# Patient Record
Sex: MALE | Race: WHITE | NOT HISPANIC OR LATINO
[De-identification: names, ages, dates, MRNs, and addresses within clinical notes are randomized per-mention and may not be internally consistent; named-entity substitution may affect disease eponyms.]

---

## 2017-04-06 ENCOUNTER — TRANSCRIPTION ENCOUNTER (OUTPATIENT)
Age: 27
End: 2017-04-06

## 2017-10-04 ENCOUNTER — EMERGENCY (EMERGENCY)
Facility: HOSPITAL | Age: 27
LOS: 1 days | Discharge: HOME | End: 2017-10-04
Attending: EMERGENCY MEDICINE
Payer: MEDICAID

## 2017-10-04 VITALS
OXYGEN SATURATION: 99 % | TEMPERATURE: 98 F | RESPIRATION RATE: 16 BRPM | HEART RATE: 76 BPM | SYSTOLIC BLOOD PRESSURE: 130 MMHG | DIASTOLIC BLOOD PRESSURE: 90 MMHG

## 2017-10-04 VITALS
OXYGEN SATURATION: 95 % | SYSTOLIC BLOOD PRESSURE: 130 MMHG | HEIGHT: 63 IN | DIASTOLIC BLOOD PRESSURE: 85 MMHG | HEART RATE: 97 BPM | WEIGHT: 143.96 LBS | TEMPERATURE: 98 F | RESPIRATION RATE: 16 BRPM

## 2017-10-04 LAB
ALBUMIN SERPL ELPH-MCNC: 3.6 G/DL — SIGNIFICANT CHANGE UP (ref 3.5–5)
ALP SERPL-CCNC: 89 U/L — SIGNIFICANT CHANGE UP (ref 40–120)
ALT FLD-CCNC: 43 U/L DA — SIGNIFICANT CHANGE UP (ref 10–60)
ANION GAP SERPL CALC-SCNC: 7 MMOL/L — SIGNIFICANT CHANGE UP (ref 5–17)
APPEARANCE UR: ABNORMAL
AST SERPL-CCNC: 42 U/L — HIGH (ref 10–40)
BASOPHILS # BLD AUTO: 0.1 K/UL — SIGNIFICANT CHANGE UP (ref 0–0.2)
BASOPHILS NFR BLD AUTO: 1 % — SIGNIFICANT CHANGE UP (ref 0–2)
BILIRUB SERPL-MCNC: 0.3 MG/DL — SIGNIFICANT CHANGE UP (ref 0.2–1.2)
BILIRUB UR-MCNC: ABNORMAL
BUN SERPL-MCNC: 12 MG/DL — SIGNIFICANT CHANGE UP (ref 7–18)
CALCIUM SERPL-MCNC: 9.1 MG/DL — SIGNIFICANT CHANGE UP (ref 8.4–10.5)
CHLORIDE SERPL-SCNC: 106 MMOL/L — SIGNIFICANT CHANGE UP (ref 96–108)
CO2 SERPL-SCNC: 26 MMOL/L — SIGNIFICANT CHANGE UP (ref 22–31)
COLOR SPEC: ABNORMAL
CREAT SERPL-MCNC: 0.92 MG/DL — SIGNIFICANT CHANGE UP (ref 0.5–1.3)
DIFF PNL FLD: ABNORMAL
EOSINOPHIL # BLD AUTO: 0.1 K/UL — SIGNIFICANT CHANGE UP (ref 0–0.5)
EOSINOPHIL NFR BLD AUTO: 1.1 % — SIGNIFICANT CHANGE UP (ref 0–6)
GLUCOSE SERPL-MCNC: 95 MG/DL — SIGNIFICANT CHANGE UP (ref 70–99)
GLUCOSE UR QL: NEGATIVE — SIGNIFICANT CHANGE UP
HCG UR QL: NEGATIVE — SIGNIFICANT CHANGE UP
HCT VFR BLD CALC: 42 % — SIGNIFICANT CHANGE UP (ref 34.5–45)
HGB BLD-MCNC: 13.7 G/DL — SIGNIFICANT CHANGE UP (ref 11.5–15.5)
KETONES UR-MCNC: NEGATIVE — SIGNIFICANT CHANGE UP
LEUKOCYTE ESTERASE UR-ACNC: ABNORMAL
LIDOCAIN IGE QN: 115 U/L — SIGNIFICANT CHANGE UP (ref 73–393)
LYMPHOCYTES # BLD AUTO: 2 K/UL — SIGNIFICANT CHANGE UP (ref 1–3.3)
LYMPHOCYTES # BLD AUTO: 24.7 % — SIGNIFICANT CHANGE UP (ref 13–44)
MCHC RBC-ENTMCNC: 30 PG — SIGNIFICANT CHANGE UP (ref 27–34)
MCHC RBC-ENTMCNC: 32.7 GM/DL — SIGNIFICANT CHANGE UP (ref 32–36)
MCV RBC AUTO: 91.8 FL — SIGNIFICANT CHANGE UP (ref 80–100)
MONOCYTES # BLD AUTO: 0.8 K/UL — SIGNIFICANT CHANGE UP (ref 0–0.9)
MONOCYTES NFR BLD AUTO: 10.2 % — SIGNIFICANT CHANGE UP (ref 2–14)
NEUTROPHILS # BLD AUTO: 5.2 K/UL — SIGNIFICANT CHANGE UP (ref 1.8–7.4)
NEUTROPHILS NFR BLD AUTO: 63 % — SIGNIFICANT CHANGE UP (ref 43–77)
NITRITE UR-MCNC: POSITIVE
PH UR: 8 — SIGNIFICANT CHANGE UP (ref 5–8)
PLATELET # BLD AUTO: 218 K/UL — SIGNIFICANT CHANGE UP (ref 150–400)
POTASSIUM SERPL-MCNC: 3.9 MMOL/L — SIGNIFICANT CHANGE UP (ref 3.5–5.3)
POTASSIUM SERPL-SCNC: 3.9 MMOL/L — SIGNIFICANT CHANGE UP (ref 3.5–5.3)
PROT SERPL-MCNC: 7.2 G/DL — SIGNIFICANT CHANGE UP (ref 6–8.3)
PROT UR-MCNC: 30 MG/DL
RBC # BLD: 4.58 M/UL — SIGNIFICANT CHANGE UP (ref 3.8–5.2)
RBC # FLD: 11.9 % — SIGNIFICANT CHANGE UP (ref 10.3–14.5)
SODIUM SERPL-SCNC: 139 MMOL/L — SIGNIFICANT CHANGE UP (ref 135–145)
SP GR SPEC: 1.01 — SIGNIFICANT CHANGE UP (ref 1.01–1.02)
UROBILINOGEN FLD QL: 12
WBC # BLD: 8.3 K/UL — SIGNIFICANT CHANGE UP (ref 3.8–10.5)
WBC # FLD AUTO: 8.3 K/UL — SIGNIFICANT CHANGE UP (ref 3.8–10.5)

## 2017-10-04 PROCEDURE — 81025 URINE PREGNANCY TEST: CPT

## 2017-10-04 PROCEDURE — 99284 EMERGENCY DEPT VISIT MOD MDM: CPT | Mod: 25

## 2017-10-04 PROCEDURE — 87086 URINE CULTURE/COLONY COUNT: CPT

## 2017-10-04 PROCEDURE — 76770 US EXAM ABDO BACK WALL COMP: CPT | Mod: 26

## 2017-10-04 PROCEDURE — 85027 COMPLETE CBC AUTOMATED: CPT

## 2017-10-04 PROCEDURE — 83690 ASSAY OF LIPASE: CPT

## 2017-10-04 PROCEDURE — 81001 URINALYSIS AUTO W/SCOPE: CPT

## 2017-10-04 PROCEDURE — 80053 COMPREHEN METABOLIC PANEL: CPT

## 2017-10-04 PROCEDURE — 96374 THER/PROPH/DIAG INJ IV PUSH: CPT

## 2017-10-04 PROCEDURE — 99284 EMERGENCY DEPT VISIT MOD MDM: CPT

## 2017-10-04 PROCEDURE — 76770 US EXAM ABDO BACK WALL COMP: CPT

## 2017-10-04 RX ORDER — CEFTRIAXONE 500 MG/1
1 INJECTION, POWDER, FOR SOLUTION INTRAMUSCULAR; INTRAVENOUS ONCE
Qty: 0 | Refills: 0 | Status: DISCONTINUED | OUTPATIENT
Start: 2017-10-04 | End: 2017-10-04

## 2017-10-04 RX ORDER — CEFTRIAXONE 500 MG/1
1 INJECTION, POWDER, FOR SOLUTION INTRAMUSCULAR; INTRAVENOUS ONCE
Qty: 0 | Refills: 0 | Status: COMPLETED | OUTPATIENT
Start: 2017-10-04 | End: 2017-10-04

## 2017-10-04 RX ORDER — CEFTRIAXONE 500 MG/1
1000 INJECTION, POWDER, FOR SOLUTION INTRAMUSCULAR; INTRAVENOUS ONCE
Qty: 0 | Refills: 0 | Status: COMPLETED | OUTPATIENT
Start: 2017-10-04 | End: 2017-10-04

## 2017-10-04 RX ORDER — CEFPODOXIME PROXETIL 100 MG
1 TABLET ORAL
Qty: 28 | Refills: 0 | OUTPATIENT
Start: 2017-10-04 | End: 2017-10-18

## 2017-10-04 RX ADMIN — CEFTRIAXONE 100 GRAM(S): 500 INJECTION, POWDER, FOR SOLUTION INTRAMUSCULAR; INTRAVENOUS at 23:00

## 2017-10-04 NOTE — ED PROVIDER NOTE - OBJECTIVE STATEMENT
26 y/o F pt w/ no significant PMHx presents to ED c/o L flank pain x2 days and dysuria/hematuria since yesterday. Pt also notes a fever (TMax 102F) and nausea for the past 2 days. Pt denies vomiting, vaginal bleeding, vaginal discharge, urinary frequency/urgency, or any other complaints. Pt reports no recent antibiotic use. LMP 2 weeks ago per pt. NKDA.

## 2017-10-04 NOTE — ED ADULT NURSE NOTE - OBJECTIVE STATEMENT
28 y/o F pt w/ no significant PMHx presents to ED c/o L flank pain x2 days and dysuria/hematuria since yesterday. Pt also notes a fever (TMax 102F) and nausea for the past 2 days. Pt denies vomiting, vaginal bleeding, vaginal discharge, urinary frequency/urgency, or any other complaints. Pt reports no recent antibiotic use. LMP 2 weeks ago per pt. NKDA.

## 2017-10-04 NOTE — ED PROVIDER NOTE - MEDICAL DECISION MAKING DETAILS
Suspect pyelonephritis. Will check labs, CBC, UA. Will give antibiotics. Do no suspect abdominal or pelvic pathology at this time.

## 2017-10-06 LAB
CULTURE RESULTS: SIGNIFICANT CHANGE UP
SPECIMEN SOURCE: SIGNIFICANT CHANGE UP

## 2017-10-07 DIAGNOSIS — N12 TUBULO-INTERSTITIAL NEPHRITIS, NOT SPECIFIED AS ACUTE OR CHRONIC: ICD-10-CM

## 2017-10-27 ENCOUNTER — TRANSCRIPTION ENCOUNTER (OUTPATIENT)
Age: 27
End: 2017-10-27

## 2017-11-14 ENCOUNTER — TRANSCRIPTION ENCOUNTER (OUTPATIENT)
Age: 27
End: 2017-11-14

## 2017-12-22 ENCOUNTER — EMERGENCY (EMERGENCY)
Facility: HOSPITAL | Age: 27
LOS: 1 days | Discharge: HOME | End: 2017-12-22
Attending: EMERGENCY MEDICINE
Payer: MEDICAID

## 2017-12-22 VITALS
WEIGHT: 143.96 LBS | OXYGEN SATURATION: 99 % | DIASTOLIC BLOOD PRESSURE: 88 MMHG | SYSTOLIC BLOOD PRESSURE: 132 MMHG | HEART RATE: 90 BPM | HEIGHT: 63 IN | TEMPERATURE: 98 F | RESPIRATION RATE: 18 BRPM

## 2017-12-22 PROCEDURE — 99284 EMERGENCY DEPT VISIT MOD MDM: CPT

## 2017-12-22 PROCEDURE — 70491 CT SOFT TISSUE NECK W/DYE: CPT

## 2017-12-22 PROCEDURE — 99284 EMERGENCY DEPT VISIT MOD MDM: CPT | Mod: 25

## 2017-12-22 PROCEDURE — 70491 CT SOFT TISSUE NECK W/DYE: CPT | Mod: 26

## 2017-12-22 RX ORDER — ACETAMINOPHEN 500 MG
650 TABLET ORAL ONCE
Qty: 0 | Refills: 0 | Status: COMPLETED | OUTPATIENT
Start: 2017-12-22 | End: 2017-12-22

## 2017-12-22 RX ADMIN — Medication 650 MILLIGRAM(S): at 22:55
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
## 2018-02-06 ENCOUNTER — TRANSCRIPTION ENCOUNTER (OUTPATIENT)
Age: 28
End: 2018-02-06

## 2018-02-08 ENCOUNTER — TRANSCRIPTION ENCOUNTER (OUTPATIENT)
Age: 28
End: 2018-02-08

## 2018-06-18 ENCOUNTER — TRANSCRIPTION ENCOUNTER (OUTPATIENT)
Age: 28
End: 2018-06-18

## 2018-06-24 ENCOUNTER — TRANSCRIPTION ENCOUNTER (OUTPATIENT)
Age: 28
End: 2018-06-24

## 2018-08-01 ENCOUNTER — OUTPATIENT (OUTPATIENT)
Dept: OUTPATIENT SERVICES | Facility: HOSPITAL | Age: 28
LOS: 1 days | End: 2018-08-01
Payer: MEDICAID

## 2018-08-01 PROCEDURE — G9001: CPT

## 2018-08-03 DIAGNOSIS — Z71.89 OTHER SPECIFIED COUNSELING: ICD-10-CM

## 2018-10-19 ENCOUNTER — TRANSCRIPTION ENCOUNTER (OUTPATIENT)
Age: 28
End: 2018-10-19

## 2018-11-26 ENCOUNTER — EMERGENCY (EMERGENCY)
Facility: HOSPITAL | Age: 28
LOS: 1 days | End: 2018-11-26
Payer: COMMERCIAL

## 2018-11-26 ENCOUNTER — INPATIENT (INPATIENT)
Facility: HOSPITAL | Age: 28
LOS: 1 days | Discharge: HOME | DRG: 103 | End: 2018-11-28
Attending: HOSPITALIST | Admitting: HOSPITALIST
Payer: COMMERCIAL

## 2018-11-26 DIAGNOSIS — H54.7 UNSPECIFIED VISUAL LOSS: ICD-10-CM

## 2018-11-26 PROCEDURE — 99285 EMERGENCY DEPT VISIT HI MDM: CPT | Mod: 25

## 2018-11-27 VITALS
TEMPERATURE: 98 F | DIASTOLIC BLOOD PRESSURE: 80 MMHG | HEART RATE: 87 BPM | SYSTOLIC BLOOD PRESSURE: 128 MMHG | OXYGEN SATURATION: 97 % | WEIGHT: 129.41 LBS | RESPIRATION RATE: 18 BRPM | HEIGHT: 63 IN

## 2018-11-27 DIAGNOSIS — H53.9 UNSPECIFIED VISUAL DISTURBANCE: ICD-10-CM

## 2018-11-27 DIAGNOSIS — F41.9 ANXIETY DISORDER, UNSPECIFIED: ICD-10-CM

## 2018-11-27 DIAGNOSIS — Z29.9 ENCOUNTER FOR PROPHYLACTIC MEASURES, UNSPECIFIED: ICD-10-CM

## 2018-11-27 DIAGNOSIS — J45.40 MODERATE PERSISTENT ASTHMA, UNCOMPLICATED: ICD-10-CM

## 2018-11-27 DIAGNOSIS — F90.9 ATTENTION-DEFICIT HYPERACTIVITY DISORDER, UNSPECIFIED TYPE: ICD-10-CM

## 2018-11-27 LAB
ANION GAP SERPL CALC-SCNC: 13 MMOL/L — SIGNIFICANT CHANGE UP (ref 5–17)
BUN SERPL-MCNC: 11 MG/DL — SIGNIFICANT CHANGE UP (ref 7–23)
CALCIUM SERPL-MCNC: 9.2 MG/DL — SIGNIFICANT CHANGE UP (ref 8.4–10.5)
CHLORIDE SERPL-SCNC: 103 MMOL/L — SIGNIFICANT CHANGE UP (ref 96–108)
CHOLEST SERPL-MCNC: 119 MG/DL — SIGNIFICANT CHANGE UP (ref 10–199)
CO2 SERPL-SCNC: 24 MMOL/L — SIGNIFICANT CHANGE UP (ref 22–31)
CREAT SERPL-MCNC: 0.79 MG/DL — SIGNIFICANT CHANGE UP (ref 0.5–1.3)
CRP SERPL-MCNC: <0.1 MG/DL — SIGNIFICANT CHANGE UP (ref 0–0.4)
GLUCOSE SERPL-MCNC: 84 MG/DL — SIGNIFICANT CHANGE UP (ref 70–99)
HBA1C BLD-MCNC: 5 % — SIGNIFICANT CHANGE UP (ref 4–5.6)
HCT VFR BLD CALC: 38.8 % — SIGNIFICANT CHANGE UP (ref 34.5–45)
HDLC SERPL-MCNC: 41 MG/DL — LOW
HGB BLD-MCNC: 12.9 G/DL — SIGNIFICANT CHANGE UP (ref 11.5–15.5)
LIPID PNL WITH DIRECT LDL SERPL: 68 MG/DL — SIGNIFICANT CHANGE UP
MAGNESIUM SERPL-MCNC: 2.1 MG/DL — SIGNIFICANT CHANGE UP (ref 1.6–2.6)
MCHC RBC-ENTMCNC: 29.1 PG — SIGNIFICANT CHANGE UP (ref 27–34)
MCHC RBC-ENTMCNC: 33.2 GM/DL — SIGNIFICANT CHANGE UP (ref 32–36)
MCV RBC AUTO: 87.6 FL — SIGNIFICANT CHANGE UP (ref 80–100)
PHOSPHATE SERPL-MCNC: 3.7 MG/DL — SIGNIFICANT CHANGE UP (ref 2.5–4.5)
PLATELET # BLD AUTO: 206 K/UL — SIGNIFICANT CHANGE UP (ref 150–400)
POTASSIUM SERPL-MCNC: 3.8 MMOL/L — SIGNIFICANT CHANGE UP (ref 3.5–5.3)
POTASSIUM SERPL-SCNC: 3.8 MMOL/L — SIGNIFICANT CHANGE UP (ref 3.5–5.3)
RBC # BLD: 4.43 M/UL — SIGNIFICANT CHANGE UP (ref 3.8–5.2)
RBC # FLD: 13.1 % — SIGNIFICANT CHANGE UP (ref 10.3–14.5)
SODIUM SERPL-SCNC: 140 MMOL/L — SIGNIFICANT CHANGE UP (ref 135–145)
TOTAL CHOLESTEROL/HDL RATIO MEASUREMENT: 2.9 RATIO — LOW (ref 3.3–7.1)
TRIGL SERPL-MCNC: 52 MG/DL — SIGNIFICANT CHANGE UP (ref 10–149)
WBC # BLD: 6.46 K/UL — SIGNIFICANT CHANGE UP (ref 3.8–10.5)
WBC # FLD AUTO: 6.46 K/UL — SIGNIFICANT CHANGE UP (ref 3.8–10.5)

## 2018-11-27 PROCEDURE — 85730 THROMBOPLASTIN TIME PARTIAL: CPT

## 2018-11-27 PROCEDURE — 81025 URINE PREGNANCY TEST: CPT

## 2018-11-27 PROCEDURE — 99223 1ST HOSP IP/OBS HIGH 75: CPT | Mod: AI,GC

## 2018-11-27 PROCEDURE — 70548 MR ANGIOGRAPHY NECK W/DYE: CPT | Mod: 26

## 2018-11-27 PROCEDURE — 82962 GLUCOSE BLOOD TEST: CPT

## 2018-11-27 PROCEDURE — 85610 PROTHROMBIN TIME: CPT

## 2018-11-27 PROCEDURE — 70450 CT HEAD/BRAIN W/O DYE: CPT

## 2018-11-27 PROCEDURE — 85027 COMPLETE CBC AUTOMATED: CPT

## 2018-11-27 PROCEDURE — 96365 THER/PROPH/DIAG IV INF INIT: CPT

## 2018-11-27 PROCEDURE — 99221 1ST HOSP IP/OBS SF/LOW 40: CPT

## 2018-11-27 PROCEDURE — 12345: CPT | Mod: NC

## 2018-11-27 PROCEDURE — 70553 MRI BRAIN STEM W/O & W/DYE: CPT | Mod: 26

## 2018-11-27 PROCEDURE — 93010 ELECTROCARDIOGRAM REPORT: CPT

## 2018-11-27 PROCEDURE — 99223 1ST HOSP IP/OBS HIGH 75: CPT

## 2018-11-27 PROCEDURE — 99285 EMERGENCY DEPT VISIT HI MDM: CPT

## 2018-11-27 PROCEDURE — 80053 COMPREHEN METABOLIC PANEL: CPT

## 2018-11-27 RX ORDER — LORATADINE 10 MG/1
10 TABLET ORAL DAILY
Qty: 0 | Refills: 0 | Status: DISCONTINUED | OUTPATIENT
Start: 2018-11-27 | End: 2018-11-28

## 2018-11-27 RX ORDER — MONTELUKAST 4 MG/1
1 TABLET, CHEWABLE ORAL
Qty: 0 | Refills: 0 | COMMUNITY

## 2018-11-27 RX ORDER — METHYLPHENIDATE HCL 5 MG
54 TABLET ORAL
Qty: 0 | Refills: 0 | Status: DISCONTINUED | OUTPATIENT
Start: 2018-11-27 | End: 2018-11-27

## 2018-11-27 RX ORDER — ACETAMINOPHEN 500 MG
650 TABLET ORAL ONCE
Qty: 0 | Refills: 0 | Status: COMPLETED | OUTPATIENT
Start: 2018-11-27 | End: 2018-11-27

## 2018-11-27 RX ORDER — MONTELUKAST 4 MG/1
10 TABLET, CHEWABLE ORAL DAILY
Qty: 0 | Refills: 0 | Status: DISCONTINUED | OUTPATIENT
Start: 2018-11-27 | End: 2018-11-28

## 2018-11-27 RX ORDER — METHYLPHENIDATE HCL 5 MG
15 TABLET ORAL THREE TIMES A DAY
Qty: 0 | Refills: 0 | Status: DISCONTINUED | OUTPATIENT
Start: 2018-11-27 | End: 2018-11-27

## 2018-11-27 RX ORDER — BUPROPION HYDROCHLORIDE 150 MG/1
1 TABLET, EXTENDED RELEASE ORAL
Qty: 0 | Refills: 0 | COMMUNITY

## 2018-11-27 RX ORDER — METHYLPHENIDATE HCL 5 MG
1 TABLET ORAL
Qty: 0 | Refills: 0 | COMMUNITY

## 2018-11-27 RX ORDER — ALBUTEROL 90 UG/1
2 AEROSOL, METERED ORAL EVERY 6 HOURS
Qty: 0 | Refills: 0 | Status: DISCONTINUED | OUTPATIENT
Start: 2018-11-27 | End: 2018-11-28

## 2018-11-27 RX ORDER — BUPROPION HYDROCHLORIDE 150 MG/1
150 TABLET, EXTENDED RELEASE ORAL DAILY
Qty: 0 | Refills: 0 | Status: DISCONTINUED | OUTPATIENT
Start: 2018-11-27 | End: 2018-11-28

## 2018-11-27 RX ORDER — FLUTICASONE FUROATE AND VILANTEROL TRIFENATATE 100; 25 UG/1; UG/1
1 POWDER RESPIRATORY (INHALATION)
Qty: 0 | Refills: 0 | COMMUNITY

## 2018-11-27 RX ORDER — ALBUTEROL 90 UG/1
2 AEROSOL, METERED ORAL
Qty: 0 | Refills: 0 | COMMUNITY

## 2018-11-27 RX ORDER — METHYLPHENIDATE HCL 5 MG
54 TABLET ORAL
Qty: 0 | Refills: 0 | Status: DISCONTINUED | OUTPATIENT
Start: 2018-11-27 | End: 2018-11-28

## 2018-11-27 RX ADMIN — Medication 650 MILLIGRAM(S): at 02:02

## 2018-11-27 RX ADMIN — MONTELUKAST 10 MILLIGRAM(S): 4 TABLET, CHEWABLE ORAL at 22:55

## 2018-11-27 RX ADMIN — BUPROPION HYDROCHLORIDE 150 MILLIGRAM(S): 150 TABLET, EXTENDED RELEASE ORAL at 02:01

## 2018-11-27 RX ADMIN — BUPROPION HYDROCHLORIDE 150 MILLIGRAM(S): 150 TABLET, EXTENDED RELEASE ORAL at 22:56

## 2018-11-27 RX ADMIN — LORATADINE 10 MILLIGRAM(S): 10 TABLET ORAL at 11:07

## 2018-11-27 RX ADMIN — MONTELUKAST 10 MILLIGRAM(S): 4 TABLET, CHEWABLE ORAL at 02:01

## 2018-11-27 RX ADMIN — Medication 54 MILLIGRAM(S): at 07:21

## 2018-11-27 RX ADMIN — Medication 650 MILLIGRAM(S): at 03:00

## 2018-11-27 RX ADMIN — LORATADINE 10 MILLIGRAM(S): 10 TABLET ORAL at 22:57

## 2018-11-27 NOTE — PROGRESS NOTE ADULT - PROBLEM SELECTOR PLAN 1
Patient presenting with R eye acute vision change with "kaleidoscope vision"  with brief complete vision loss associated with reported dysarthria and RUE weakness. CT head neg for acute hemorrhage. Fundoscopic exam wnl.  optho eval appreciated DDx: ocular migraine, demyelinating disease, TIA, vasculitis  NEurology consult appreciated- MRI brain and orbits with MRA head and neck, ?TTE  neurochecks q4  - ESR 10   EKG sinus rhythm

## 2018-11-27 NOTE — CONSULT NOTE ADULT - SUBJECTIVE AND OBJECTIVE BOX
[facility] Ophthalmology Consult Note    HPI: 28 y/ F, PMH ADHD, depression presented to [place] ED with visual complaints and right arm/hand numbness x 1 day. Patient states at 12:30PM yesterday, she experienced a visual disturbance of her eye described as "kaleidoscope appearance" and then two minute episode of complete loss of vision OD. The episode resolved however the patient endorsed difficulty using her right hand and numbness/parathesias of the limb. CT performed at [place] negative for acute pathology. Neurology team at [place] recommended ophtho evaluation, necessitating transfer to [facility].  Patient interviewed early in the morning. She states her visual disturbance comes and goes, but she is currently asymptomatic. Denies eye pain, floaters, diplopia, recent trauma, drug use, redness, irritation.   She has had chronic flashes of her right eye for 4 years, but none recently.        PMH: ADHD, depression  Meds: Wellbutrin, Methylphenidate  POcHx (including surgeries/lasers/trauma):  Myopic astigmatism. Glasses wearer.  Drops: ART PRN  FamHx: Adopted.  Social Hx:  in [place].  Allergies: NKDA    ROS:  General (neg), Vision (per HPI), Head and Neck (neg), Pulm (neg), CV (neg), GI (neg),  (neg), Musculoskeletal (neg), Skin/Integ (neg), Neuro (neg), Endocrine (neg), Heme (neg), All/Immuno (neg)    Mood and Affect Appropriate ( x ),  Oriented to Time, Place, and Person x 3 ( x )    Ophthalmology Exam    Visual acuity (sc): 20/20 OU  Pupils: PERRL OU, no APD  Ttono: 14 OU  Extraocular movements (EOMs): Full OU, no pain, no diplopia  Confrontational Visual Field (CVF):  Full OU  Color Plates: 12/12 OU    Slit Lamp Exam (SLE)  External:  Flat OU  Lids/Lashes/Lacrimal Ducts: Flat OU    Sclera/Conjunctiva:  W+Q OU  Cornea: Cl OU  Anterior Chamber: D+Q OU   Iris:  Flat OU  Lens:  Cl OU    Fundus Exam: dilated with 1% tropicamide and 2.5% phenylephrine  Approval obtained from primary team for dilation  Patient aware that pupils can remained dilated for at least 4-6 hours  Exam performed with 20D lens    Vitreous: wnl OU  Disc, cup/disc: sharp and pink, 0.4 OU  Macula:  wnl OU  Vessels:  wnl OU  Periphery: wnl OU    Diagnostic Testing:    Assessment:      Plan:  - no acute ophtho intervention  - workup per neurology/primary team    Follow-Up:  Patient should follow up his/her ophthalmologist or in the [facility] within 1 week of discharge  [address] [place] [address].  [place], [place] [id number] [phone] [facility] Ophthalmology Consult Note    HPI: 28 y/ F, PMH ADHD, depression, migraines presented to [place] ED with visual complaints and right arm/hand numbness x 1 day. Patient states at 12:30PM yesterday, she experienced a visual disturbance of her eye described as "kaleidoscope appearance" and then two minute episode of complete loss of vision OD. The episode resolved however the patient endorsed difficulty using her right hand and numbness/parathesias of the limb. CT performed at [place] negative for acute pathology. Neurology team at [place] recommended ophtho evaluation, necessitating transfer to [facility].  Patient interviewed early in the morning. She states her visual disturbance comes and goes, but she is currently asymptomatic. Denies eye pain, floaters, diplopia, recent trauma, drug use, redness, irritation. She says her limbs have been feeling more "tired" lately.   She has had chronic flashes of her right eye for 4 years, but none recently.        PMH: ADHD, depression, migraines  Meds: Wellbutrin, Methylphenidate  POcHx (including surgeries/lasers/trauma):  Myopic astigmatism. Glasses wearer.  Drops: ART PRN  FamHx: Adopted.  Social Hx:  in [place].  Allergies: NKDA    ROS:  General (neg), Vision (per HPI), Head and Neck (neg), Pulm (neg), CV (neg), GI (neg),  (neg), Musculoskeletal (neg), Skin/Integ (neg), Neuro (neg), Endocrine (neg), Heme (neg), All/Immuno (neg)    Mood and Affect Appropriate ( x ),  Oriented to Time, Place, and Person x 3 ( x )    Ophthalmology Exam    Visual acuity (sc): 20/20 OU  Pupils: PERRL OU, no APD  Ttono: 14 OU  Extraocular movements (EOMs): Full OU, no pain, no diplopia  Confrontational Visual Field (CVF):  Full OU  Color Plates: 12/12 OU    Slit Lamp Exam (SLE)  External:  Flat OU  Lids/Lashes/Lacrimal Ducts: Flat OU    Sclera/Conjunctiva:  W+Q OU  Cornea: Cl OU  Anterior Chamber: D+Q OU   Iris:  Flat OU  Lens:  Cl OU    Fundus Exam: dilated with 1% tropicamide and 2.5% phenylephrine  Approval obtained from primary team for dilation  Patient aware that pupils can remained dilated for at least 4-6 hours  Exam performed with 20D lens    Vitreous: wnl OU  Disc, cup/disc: sharp and pink, 0.3 OU  Macula:  wnl OU  Vessels:  wnl OU  Periphery: wnl OU    Diagnostic Testing: CT head performed at [place] negative for acute pathology    Assessment:  28 y/ F, PMH ADHD, depression presented to [place] ED with visual complaints and right arm/hand numbness x 1 day. Patient states at 12:30PM yesterday, she experienced a visual disturbance of her eye described as "kaleidoscope appearance" and then two minute episode of complete loss of vision OD. The episode resolved however the patient endorsed difficulty using her right hand and numbness/parathesias of the limb. CT performed at [place] negative for acute pathology. Neurology team at [place] recommended ophtho evaluation, necessitating transfer to [facility]. On exam, VA 20/20 OU, no APD, IOP WNL. Anterior segment and posterior segment exams unremarkable. Differential diagnosis includes TIA vs. migraine vs. demyelinating process      Plan:  - no acute ophtho intervention  - workup per neurology/primary team    Follow-Up:  Patient should follow up his/her ophthalmologist or in the [facility] Practice within 1 week of discharge  [address] [place] [address] [phone]

## 2018-11-27 NOTE — PROGRESS NOTE ADULT - ASSESSMENT
28 F with PMH of ADHD, depression, asthma chronic headaches who was transferred to [place] ED with visual complaints and right arm/hand numbness x 1 day, found to have an unremarkable CT head imaging and opthalmologic evaluation, raising concerning for underlying demyelinating disease vs migraine etiologies vs TIA vs vasculitis

## 2018-11-27 NOTE — H&P ADULT - NSHPSOCIALHISTORY_GEN_ALL_CORE
Never smoker  No alcohol use  No illicit drugs  Works as schoolteacher  Enganged, monogamous with fiance Never smoker  No alcohol use  No illicit drugs  Works as schoolteacher  Engaged, monogamous with ficurtis

## 2018-11-27 NOTE — H&P ADULT - HISTORY OF PRESENT ILLNESS
28 F with PMH of ADHD, depression, asthma chronic headaches who was transferred to [place] ED with visual complaints and right arm/hand numbness x 1 day. Patient was in her usual state of health while working at her job as  when around 12:20 PM on 11/26, she suddenly started experiencing kaleidoscope vision" in her R eye. She had no associated eye pain, trauma, preceding headache. The kaleidoscope vision then trantitioned to complete vision loss in the eye for 2 mins bu then resolved. She denies sensation of floaters or  curtain sign. During this episode, endorsing having difficulty holding things in her R hand as well as mild vertigo Also patient's student also reported she was saying things that did no make sense. Patient has had chronic debilitating headaches with photophobia and nausea but has not been on migraine regimen. She otherewise denies any preceding fevers, chest pain, palpitations, tracel or sick contacts. Due to persistence of sx, patient sought care at [place] ED. Initial VS T 98.6, , BP: 136/89 and 97% on RA. Labs  unremarkable. There she was briefly seen by neurology who recommended transfer to [facility] for evaluation by ophtho. She had undergone CT brain stroke protocol at  ED which was unremarkable. Here patient was evaluated by dilated exam by optho which was also within normal limits. Currently patient reports that there is intermittent episodes of the kaleidoscope vision" but overall without further episodes of eye pain or complete vision loss. SHe denied any form of illicit drug use. 28 F with PMH of ADHD, depression, asthma chronic headaches who was transferred to [place] ED with visual complaints and right arm/hand numbness x 1 day. Patient was in her usual state of health while working at her job as  when around 12:20 PM on 11/26, she suddenly started experiencing kaleidoscope vision" in her R eye. She had no associated eye pain, trauma, preceding headache. The kaleidoscope vision then trantitioned to complete vision loss in the eye for 2 mins bu then resolved. She denies sensation of floaters or  curtain sign. During this episode, endorsing having difficulty holding things in her R hand as well as mild vertigo Also patient's student also reported she was saying things that did no make sense. Patient has had chronic debilitating headaches with photophobia and nausea but has not been on migraine regimen. She otherewise denies any preceding fevers, chest pain, palpitations, tracel or sick contacts. Due to persistence of sx, patient sought care at [place] ED. Initial VS T 98.6, , BP: 136/89 and 97% on RA. Labs  unremarkable. There she was briefly seen by neurology who recommended transfer to [facility] for evaluation by ophtho. She had undergone CT brain stroke protocol at  ED which was unremarkable. Here patient was evaluated by dilated exam by optho which was also within normal limits. Currently patient reports that there is intermittent episodes of the kaleidoscope vision" but overall without further episodes of eye pain or complete vision loss. SHe denied any form of illicit drug use. She has no known FH as she is adopted 28 F with PMH of ADHD, depression, asthma chronic headaches who was transferred to [place] ED with visual complaints and right arm/hand numbness x 1 day. Patient was in her usual state of health while working at her job as  when around 12:20 PM on 11/26, she suddenly started experiencing kaleidoscope vision" in her R eye. She had no associated eye pain, trauma, preceding headache. The kaleidoscope vision then trantitioned to complete vision loss in the eye for 2 mins bu then resolved. She denies sensation of floaters or  curtain sign. During this episode, endorsing having difficulty holding things in her R hand as well as mild vertigo Also patient's student also reported she was saying things that did no make sense. Patient has had chronic debilitating headaches with photophobia and nausea but has not been on migraine regimen. She otherewise denies any preceding fevers, chest pain, palpitations, tracel or sick contacts. Due to persistence of sx, patient sought care at [place] ED. Initial VS T 98.6, , BP: 136/89 and 97% on RA. Labs  unremarkable. There she was briefly seen by neurology who recommended transfer to [facility] for evaluation by ophtho. She had undergone CT brain stroke protocol at  ED which was unremarkable. Here patient was evaluated by dilated exam by optho which was also within normal limits. Currently patient reports that there is intermittent episodes of the kaleidoscope vision" but overall without further episodes of eye pain or complete vision loss. SHe denied any form of illicit drug use. She has no known FHx of CVA or autoimmune diseases, except hypothyroidism as she is adopted

## 2018-11-27 NOTE — H&P ADULT - NSHPPHYSICALEXAM_GEN_ALL_CORE
PHYSICAL EXAM:    Vital Signs Last 24 Hrs  T(C): 36.8 (27 Nov 2018 00:07), Max: 37 (26 Nov 2018 15:17)  T(F): 98.2 (27 Nov 2018 00:07), Max: 98.6 (26 Nov 2018 15:17)  HR: 87 (27 Nov 2018 00:07) (77 - 109)  BP: 128/80 (27 Nov 2018 00:07) (127/77 - 136/89)  BP(mean): --  RR: 18 (27 Nov 2018 00:07) (16 - 18)  SpO2: 97% (27 Nov 2018 00:07) (97% - 100%)    General: Young female resting in bed no acute distress.  HEENT: NCAT. Bilateral fixed and dilated pupils (optho exam). No scleral icterus or injection.  Moist MM.  No oropharyngeal exudates.    Neck: Supple.  Full ROM.  No JVD.  No thyromegaly. No lymphadenopathy.   Heart: RRR.  Normal S1 and S2.  No murmurs, rubs, or gallops.   Lungs: CTAB. No wheezes, crackles, or rhonchi.    Abdomen: BS+, soft, NT/ND.  No organomegaly.  Skin: Warm and dry.  No rashes.  Extremities: No edema, clubbing, or cyanosis.  2+ peripheral pulses b/l.  Musculoskeletal: No deformities.  No spinal or paraspinal tenderness.  Neuro: A&Ox3.  CN II-XII intact.  5/5 strength in UE and LE b/l.  Tactile sensation intact in UE and LE b/l.  Cerebellar function intact

## 2018-11-27 NOTE — H&P ADULT - NSHPLABSRESULTS_GEN_ALL_CORE
CBC Full  -  ( 26 Nov 2018 16:08 )  WBC Count : 8.7 K/uL  Hemoglobin : 14.6 g/dL  Hematocrit : 43.2 %  Platelet Count - Automated : 226 K/uL  Mean Cell Volume : 87.9 fl  Mean Cell Hemoglobin : 29.6 pg  Mean Cell Hemoglobin Concentration : 33.7 gm/dL  Auto Neutrophil # : 5.5 K/uL  Auto Lymphocyte # : 2.6 K/uL  Auto Monocyte # : 0.5 K/uL  Auto Eosinophil # : 0.0 K/uL  Auto Basophil # : 0.1 K/uL  Auto Neutrophil % : 63.3 %  Auto Lymphocyte % : 30.1 %  Auto Monocyte % : 5.6 %  Auto Eosinophil % : 0.2 %  Auto Basophil % : 0.8 %    11-26    141  |  105  |  10  ----------------------------<  92  3.8   |  27  |  0.89    Ca    9.0      26 Nov 2018 16:08    TPro  8.1  /  Alb  4.4  /  TBili  0.3  /  DBili  x   /  AST  18  /  ALT  27  /  AlkPhos  76  11-26    LIVER FUNCTIONS - ( 26 Nov 2018 16:08 )  Alb: 4.4 g/dL / Pro: 8.1 g/dL / ALK PHOS: 76 U/L / ALT: 27 U/L DA / AST: 18 U/L / GGT: x             PT/INR - ( 26 Nov 2018 16:08 )   PT: 12.8 sec;   INR: 1.15 ratio         PTT - ( 26 Nov 2018 16:08 )  PTT:34.2 sec          EKG:       Imaging:  < from: CT Brain Stroke Protocol (11.26.18 @ 16:02) >    FINDINGS:      HEMISPHERES:  No mass or space occupying lesion.  No acute ischemic   changes or hemorrhagic foci are suggested.  VENTRICLES:  Midline and normal in size.  POSTERIOR FOSSA:  The brain stem and cerebellum are unremarkable.  No CP   angle lesion noted.  EXTRACEREBRAL SPACES:  No subdural or epidural collections are noted.  SKULL BASE AND CALVARIUM:  Appears intact.  No fracture or destructive   lesion is identified.  SINUSES AND MASTOIDS:  Clear.  MISCELLANEOUS:  No orbital or suprasellar abnormality noted.      < end of copied text > Labs, imaging and EKG tracing personally reviewed    CBC Full  -  ( 26 Nov 2018 16:08 )  WBC Count : 8.7 K/uL  Hemoglobin : 14.6 g/dL  Hematocrit : 43.2 %  Platelet Count - Automated : 226 K/uL  Mean Cell Volume : 87.9 fl  Mean Cell Hemoglobin : 29.6 pg  Mean Cell Hemoglobin Concentration : 33.7 gm/dL  Auto Neutrophil # : 5.5 K/uL  Auto Lymphocyte # : 2.6 K/uL  Auto Monocyte # : 0.5 K/uL  Auto Eosinophil # : 0.0 K/uL  Auto Basophil # : 0.1 K/uL  Auto Neutrophil % : 63.3 %  Auto Lymphocyte % : 30.1 %  Auto Monocyte % : 5.6 %  Auto Eosinophil % : 0.2 %  Auto Basophil % : 0.8 %    11-26    141  |  105  |  10  ----------------------------<  92  3.8   |  27  |  0.89    Ca    9.0      26 Nov 2018 16:08    TPro  8.1  /  Alb  4.4  /  TBili  0.3  /  DBili  x   /  AST  18  /  ALT  27  /  AlkPhos  76  11-26    LIVER FUNCTIONS - ( 26 Nov 2018 16:08 )  Alb: 4.4 g/dL / Pro: 8.1 g/dL / ALK PHOS: 76 U/L / ALT: 27 U/L DA / AST: 18 U/L / GGT: x             PT/INR - ( 26 Nov 2018 16:08 )   PT: 12.8 sec;   INR: 1.15 ratio         PTT - ( 26 Nov 2018 16:08 )  PTT:34.2 sec          EKG:       Imaging:  < from: CT Brain Stroke Protocol (11.26.18 @ 16:02) >    FINDINGS:      HEMISPHERES:  No mass or space occupying lesion.  No acute ischemic   changes or hemorrhagic foci are suggested.  VENTRICLES:  Midline and normal in size.  POSTERIOR FOSSA:  The brain stem and cerebellum are unremarkable.  No CP   angle lesion noted.  EXTRACEREBRAL SPACES:  No subdural or epidural collections are noted.  SKULL BASE AND CALVARIUM:  Appears intact.  No fracture or destructive   lesion is identified.  SINUSES AND MASTOIDS:  Clear.  MISCELLANEOUS:  No orbital or suprasellar abnormality noted.      < end of copied text >

## 2018-11-27 NOTE — PATIENT PROFILE ADULT - VISION (WITH CORRECTIVE LENSES IF THE PATIENT USUALLY WEARS THEM):
impaired only in right eye/Partially impaired: cannot see medication labels or newsprint, but can see obstacles in path, and the surrounding layout; can count fingers at arm's length

## 2018-11-27 NOTE — PROGRESS NOTE ADULT - SUBJECTIVE AND OBJECTIVE BOX
Patient is a 28y old  Female who presents with a chief complaint of R eye vision changes (27 Nov 2018 10:53)        SUBJECTIVE / OVERNIGHT EVENTS: no acute complaints      MEDICATIONS  (STANDING):  buPROPion XL . 150 milliGRAM(s) Oral daily  loratadine 10 milliGRAM(s) Oral daily  methylphenidate ER (CONCERTA) 54 milliGRAM(s) Oral <User Schedule>  montelukast 10 milliGRAM(s) Oral daily    MEDICATIONS  (PRN):  ALBUTerol    90 MICROgram(s) HFA Inhaler 2 Puff(s) Inhalation every 6 hours PRN Shortness of Breath and/or Wheezing      Vital Signs Last 24 Hrs  T(C): 36.8 (27 Nov 2018 05:17), Max: 37 (26 Nov 2018 15:17)  T(F): 98.2 (27 Nov 2018 05:17), Max: 98.6 (26 Nov 2018 15:17)  HR: 73 (27 Nov 2018 05:17) (73 - 109)  BP: 104/66 (27 Nov 2018 05:17) (104/66 - 136/89)  BP(mean): --  RR: 18 (27 Nov 2018 05:17) (16 - 18)  SpO2: 99% (27 Nov 2018 05:17) (97% - 100%)  CAPILLARY BLOOD GLUCOSE      POCT Blood Glucose.: 111 mg/dL (26 Nov 2018 15:40)    I&O's Summary    27 Nov 2018 07:01  -  27 Nov 2018 14:05  --------------------------------------------------------  IN: 120 mL / OUT: 0 mL / NET: 120 mL        PHYSICAL EXAM:  GENERAL: NAD  HEAD:  Atraumatic, Normocephalic  EYES: conjunctiva and sclera clear  NECK: No JVD  CHEST/LUNG: CTA b/l  HEART: S1 S2 RRR  ABDOMEN: +BS Soft, NT/ND  EXTREMITIES:  2+ DP Pulses, No c/c/e  NEUROLOGY: AAOx3, no focal deficits   SKIN: No rashes or lesions    LABS:                        12.9   6.46  )-----------( 206      ( 27 Nov 2018 08:01 )             38.8     11-27    140  |  103  |  11  ----------------------------<  84  3.8   |  24  |  0.79    Ca    9.2      27 Nov 2018 07:04  Phos  3.7     11-27  Mg     2.1     11-27    TPro  8.1  /  Alb  4.4  /  TBili  0.3  /  DBili  x   /  AST  18  /  ALT  27  /  AlkPhos  76  11-26    PT/INR - ( 26 Nov 2018 16:08 )   PT: 12.8 sec;   INR: 1.15 ratio         PTT - ( 26 Nov 2018 16:08 )  PTT:34.2 sec          RADIOLOGY & ADDITIONAL TESTS:    Imaging Personally Reviewed: MRI brain and orbits ordered  Consultant(s) Notes Reviewed: Neurology   Care Discussed with Consultants/Other Providers:

## 2018-11-27 NOTE — H&P ADULT - NSHPREVIEWOFSYSTEMS_GEN_ALL_CORE
REVIEW OF SYSTEMS:    CONSTITUTIONAL: No weakness, fevers or chills  EYES/ENT:+ visual changes;  No vertigo or throat pain   NECK: No pain or stiffness  RESPIRATORY: No cough, wheezing, hemoptysis; No shortness of breath  CARDIOVASCULAR: No chest pain or palpitations  GASTROINTESTINAL: No abdominal pain; nausea, vomiting;  diarrhea or constipation. No hemetemesis, melena or hematochezia.  GENITOURINARY: No dysuria, frequency or hematuria  NEUROLOGICAL: + numbness tingle of R arm  SKIN: No itching, burning, rashes, or lesions   All other review of systems is negative unless indicated above.

## 2018-11-27 NOTE — H&P ADULT - PROBLEM SELECTOR PLAN 1
Patient presenting with R eye acute vision change with "kaleidoscope vision"  with brief complete vision loss associated with reported dysarthria and RUE weakness. Although she is still intermittent R vision sx, remaining associated are resolved. CT head neg for acute hemorrhage. Fundoscopic exam wnl.  DDx: ocular migraine, demyelinating disease, TIA, vasculitis  - neuro check q12  - would obtain MRI with MRA head and neck  - ESR, CRP   - EKG x 1   - neurology consult in AM Patient presenting with R eye acute vision change with "kaleidoscope vision"  with brief complete vision loss associated with reported dysarthria and RUE weakness. Although she is still intermittent R vision sx, remaining associated are resolved. CT head neg for acute hemorrhage. Fundoscopic exam wnl.  DDx: ocular migraine, demyelinating disease, TIA, vasculitis  - neuro check q4  - MRI with MRA head and neck  - ESR, CRP   - EKG now  - TTE  - neurology consult in AM

## 2018-11-27 NOTE — H&P ADULT - PROBLEM SELECTOR PLAN 3
No recent increased use of rescue inhaler or night sx. Clinically with benign lung exam and saturating well on RA  - c/w Breo, albuterol,  montelukast, and loratadine

## 2018-11-27 NOTE — H&P ADULT - ASSESSMENT
28 F with PMH of ADHD, depression, asthma chronic headaches who was transferred to [place] ED with visual complaints and right arm/hand numbness x 1 day, found to have an unremarkable CT head imaging and opthalmologic evaluation, raising concerning for underlying demyelinating disease vs migraine etiologies vs TIA vs vasculitis

## 2018-11-27 NOTE — CONSULT NOTE ADULT - SUBJECTIVE AND OBJECTIVE BOX
Neurology Consult    Reason for consult:    HPI:  28 F with PMH of ADHD ( on methylphenidate), depression ( on Bupropion), Anxeity ( PRN Lorazepam), asthma ( On Montelukast)  chronic tension headaches who was transferred to [place] ED with visual complaints (kaleidoscope/ zigzag vision in her R eye) and right arm/hand weakness and  numbness lasting for 2.5 hours  x 1 day.     Patient was in her usual state of health while working at her job as  when around 12:20 PM on 11/26, she suddenly started experiencing kaleidoscope/ zigzag vision in her R eye. She had no associated eye pain, trauma, preceding headache. The kaleidoscope vision then transitioned to complete vision loss in right eye for 2 mins but then resolved. She denies sensation of floaters or  curtain sign. During this episode, endorsing having difficulty holding things in her R hand as well as mild dizziness.  Also patient's student also reported she was saying things that did no make sense. This all weakness lasted for around 2 hours and resolved at around at about 2.30pm-3pm  Since yesterday she had 6-7 episodes of kaleidoscope/ zigzag vision in her R eye. lasting for around 30mins associated with headaches. She describes her headaches as sharp 8-9/10 behind right eye.   She also complains of pain in her right eye when she looks up and on left.   She also complains of neck pain behind her ear radiating to back     Patient has had chronic debilitating headaches with photophobia and nausea but has not been on migraine regimen, never needed ED visits. She otherewise denies any preceding fevers, chest pain, palpitations, tracel or sick contacts. Due to persistence of sx, patient sought care at [place] ED. Initial VS T 98.6, , BP: 136/89 and 97% on RA. Labs  unremarkable. There she was briefly seen by neurology who recommended transfer to [facility] for evaluation by ophtho. She had undergone CT brain stroke protocol at  ED which was unremarkable. Here patient was evaluated by dilated exam by optho which was also within normal limits. Currently patient reports that there is intermittent episodes of the kaleidoscope vision" but overall without further episodes of eye pain or complete vision loss. SHe denied any form of illicit drug use. She has no known FHx of CVA or autoimmune diseases, except hypothyroidism as she is adopted         REVIEW OF SYSTEMS:  Constitutional: No fever, chills, fatigue, weakness.  Eyes: No eye pain, visual disturbances, or discharge.   ENT:  No difficulty hearing, tinnitus, vertigo. No sinus or throat pain.  Neck: No pain or stiffness.  Respiratory: No cough, dyspnea, wheezing.  Cardiovascular: No chest pain, palpitations.  Gastrointestinal: No abdominal pain. No nausea, vomiting, diarrhea, or constipation.   Genitourinary: No dysuria, frequency, hematuria or incontinence.  Neurological: + headaches, + right occipital tenderness No lightheadedness, vertigo, numbness or tremors.  Psychiatric: No depression, anxiety, mood swings, or difficulty sleeping.  Musculoskeletal: No joint pain or swelling. No muscle, back, or extremity pain.  Skin: No itching, burning, rashes or lesions.   Lymph Nodes: No enlarged glands  Endocrine: No heat or cold intolerance, no hair loss.  Allergy and Immunologic: No hives or eczema.    MEDICATIONS  ALBUTerol    90 MICROgram(s) HFA Inhaler 2 Puff(s) Inhalation every 6 hours PRN  buPROPion XL . 150 milliGRAM(s) Oral daily  loratadine 10 milliGRAM(s) Oral daily  methylphenidate ER (CONCERTA) 54 milliGRAM(s) Oral <User Schedule>  montelukast 10 milliGRAM(s) Oral daily      PMH: Asthma  Depression  ADHD  No pertinent past medical history       PSH: No significant past surgical history  No significant past surgical history      FAMILY HISTORY:  No pertinent family history in first degree relatives    No history of dementia, strokes, or seizures     SOCIAL HISTORY:  No history of tobacco or alcohol use     Allergies    No Known Allergies    Intolerances        Vital Signs Last 24 Hrs  T(C): 36.8 (27 Nov 2018 05:17), Max: 37 (26 Nov 2018 15:17)  T(F): 98.2 (27 Nov 2018 05:17), Max: 98.6 (26 Nov 2018 15:17)  HR: 73 (27 Nov 2018 05:17) (73 - 109)  BP: 104/66 (27 Nov 2018 05:17) (104/66 - 136/89)  BP(mean): --  RR: 18 (27 Nov 2018 05:17) (16 - 18)  SpO2: 99% (27 Nov 2018 05:17) (97% - 100%)    Neurological Examination:    Mental Status: Patient is alert, awake, oriented X3. Patient is fluent, no dysarthria, no aphasia. Follows commands well and able to answer questions appropriately. Mood and affect normal.    Cranial Nerves: PERRL, EOMI, visual field intact, V1-V3 intact, no gross facial asymmetry, tongue/uvula midline. mild painful eye movements on right eye when she looks on left upper side      Motor Exam: No drift  Right upper extremity: 5/5  Left upper extremity: 5/5  Right lower extremity: 5/5  Left lower extremity: 5/5    Normal bulk/tone    Sensory: Intact to light touch and pinprick bilaterally. No extinction    Coordination: Finger to nose intact bilaterally     Gait: Normal      Reflexes: Bilateral 2+ Biceps, Brachial, Patellar, Ankle  Plantar: Down bilateral    GENERAL: No acute distress  HEENT:  Normocephalic, atraumatic  EXTREMITIES: No edema, clubbing, cyanosis  MUSCULOSKELETAL: Normal range of motion  SKIN: No rashes    LABS:  CBC Full  -  ( 27 Nov 2018 08:01 )  WBC Count : 6.46 K/uL  Hemoglobin : 12.9 g/dL  Hematocrit : 38.8 %  Platelet Count - Automated : 206 K/uL  Mean Cell Volume : 87.6 fl  Mean Cell Hemoglobin : 29.1 pg  Mean Cell Hemoglobin Concentration : 33.2 gm/dL  Auto Neutrophil # : x  Auto Lymphocyte # : x  Auto Monocyte # : x  Auto Eosinophil # : x  Auto Basophil # : x  Auto Neutrophil % : x  Auto Lymphocyte % : x  Auto Monocyte % : x  Auto Eosinophil % : x  Auto Basophil % : x      11-27    140  |  103  |  11  ----------------------------<  84  3.8   |  24  |  0.79    Ca    9.2      27 Nov 2018 07:04  Phos  3.7     11-27  Mg     2.1     11-27    TPro  8.1  /  Alb  4.4  /  TBili  0.3  /  DBili  x   /  AST  18  /  ALT  27  /  AlkPhos  76  11-26    LIVER FUNCTIONS - ( 26 Nov 2018 16:08 )  Alb: 4.4 g/dL / Pro: 8.1 g/dL / ALK PHOS: 76 U/L / ALT: 27 U/L DA / AST: 18 U/L / GGT: x           Hemoglobin A1C:       PT/INR - ( 26 Nov 2018 16:08 )   PT: 12.8 sec;   INR: 1.15 ratio         PTT - ( 26 Nov 2018 16:08 )  PTT:34.2 sec    RADIOLOGY:  < from: CT Brain Stroke Protocol (11.26.18 @ 16:02) >  EXAM:  CT BRAIN STROKE PROTOCOL                            PROCEDURE DATE:  11/26/2018          INTERPRETATION:  INDICATION:  The visualized right  TECHNIQUE:  A non contrast 2.5mm axial CT study of the brain was   performed from skull base to vertex. Coronal and sagittal reformations   were generated from the axial data.  COMPARISON EXAMINATION:  No prior    FINDINGS:      HEMISPHERES:  No mass or space occupying lesion.  No acute ischemic   changes or hemorrhagic foci are suggested.  VENTRICLES:  Midline and normal in size.  POSTERIOR FOSSA:  The brain stem and cerebellum are unremarkable.  No CP   angle lesion noted.  EXTRACEREBRAL SPACES:  No subdural or epidural collections are noted.  SKULL BASE AND CALVARIUM:  Appears intact.  No fracture or destructive   lesion is identified.  SINUSES AND MASTOIDS:  Clear.  MISCELLANEOUS:  No orbital or suprasellar abnormality noted.      IMPRESSION:    1)  unremarkable CT study of the brain  2)  clear sinuses and mastoids.    Follow-up MR imaging recommended for further assessment of visualized.    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    IMPRESSION:     Patent cervical vasculature. Patent upper aerodigestive tract.    Left thyroid nodule can be better assessed with ultrasound.    < end of copied text > Neurology Consult    Reason for consult:    HPI:  28 F with PMH of ADHD ( on methylphenidate), depression ( on Bupropion), Anxeity ( PRN Lorazepam), asthma ( On Montelukast)  chronic tension headaches who was transferred to [place] ED with visual complaints (kaleidoscope/ zigzag vision in her R eye) and right arm/hand weakness and  numbness lasting for 2.5 hours  x 1 day.     Patient was in her usual state of health while working at her job as  when around 12:20 PM on 11/26, she suddenly started experiencing kaleidoscope/ zigzag vision in her R eye. She had no associated eye pain, trauma, preceding headache. The kaleidoscope vision then transitioned to complete vision loss in right eye for 2 mins but then resolved. She denies sensation of floaters or  curtain sign. During this episode, endorsing having difficulty holding things in her R hand as well as mild dizziness.  Also patient's student also reported she was saying things that did no make sense. This all weakness lasted for around 2 hours and resolved at around at about 2.30pm-3pm  Since yesterday she had 6-7 episodes of kaleidoscope/ zigzag vision in her R eye. lasting for around 30mins associated with headaches. She describes her headaches as sharp 8-9/10 behind right eye.   She also complains of pain in her right eye when she looks up and on left.   She also complains of neck pain behind her ear radiating to back     Patient has had chronic debilitating headaches with photophobia and nausea but has not been on migraine regimen, never needed ED visits. She otherewise denies any preceding fevers, chest pain, palpitations, tracel or sick contacts. Due to persistence of sx, patient sought care at [place] ED. Initial VS T 98.6, , BP: 136/89 and 97% on RA. Labs  unremarkable. There she was briefly seen by neurology who recommended transfer to [facility] for evaluation by ophtho. She had undergone CT brain stroke protocol at  ED which was unremarkable. Here patient was evaluated by dilated exam by optho which was also within normal limits. Currently patient reports that there is intermittent episodes of the kaleidoscope vision" but overall without further episodes of eye pain or complete vision loss. SHe denied any form of illicit drug use. She has no known FHx of CVA or autoimmune diseases, except hypothyroidism as she is adopted         REVIEW OF SYSTEMS:  Constitutional: No fever, chills, fatigue, weakness.  Eyes: No eye pain, visual disturbances, or discharge.   ENT:  No difficulty hearing, tinnitus, vertigo. No sinus or throat pain.  Neck: No pain or stiffness.  Respiratory: No cough, dyspnea, wheezing.  Cardiovascular: No chest pain, palpitations.  Gastrointestinal: No abdominal pain. No nausea, vomiting, diarrhea, or constipation.   Genitourinary: No dysuria, frequency, hematuria or incontinence.  Neurological: + headaches, + right occipital tenderness No lightheadedness, vertigo, numbness or tremors.  Psychiatric: No depression, anxiety, mood swings, or difficulty sleeping.  Musculoskeletal: No joint pain or swelling. No muscle, back, or extremity pain.  Skin: No itching, burning, rashes or lesions.   Lymph Nodes: No enlarged glands  Endocrine: No heat or cold intolerance, no hair loss.  Allergy and Immunologic: No hives or eczema.    MEDICATIONS  ALBUTerol    90 MICROgram(s) HFA Inhaler 2 Puff(s) Inhalation every 6 hours PRN  buPROPion XL . 150 milliGRAM(s) Oral daily  loratadine 10 milliGRAM(s) Oral daily  methylphenidate ER (CONCERTA) 54 milliGRAM(s) Oral <User Schedule>  montelukast 10 milliGRAM(s) Oral daily      PMH: Asthma  Depression  ADHD  No pertinent past medical history       PSH: No significant past surgical history  No significant past surgical history      FAMILY HISTORY:  No pertinent family history in first degree relatives    No history of dementia, strokes, or seizures     SOCIAL HISTORY:  No history of tobacco or alcohol use     Allergies    No Known Allergies    Intolerances        Vital Signs Last 24 Hrs  T(C): 36.8 (27 Nov 2018 05:17), Max: 37 (26 Nov 2018 15:17)  T(F): 98.2 (27 Nov 2018 05:17), Max: 98.6 (26 Nov 2018 15:17)  HR: 73 (27 Nov 2018 05:17) (73 - 109)  BP: 104/66 (27 Nov 2018 05:17) (104/66 - 136/89)  BP(mean): --  RR: 18 (27 Nov 2018 05:17) (16 - 18)  SpO2: 99% (27 Nov 2018 05:17) (97% - 100%)    Neurological Examination:    Mental Status: Patient is alert, awake, oriented X3. Patient is fluent, no dysarthria, no aphasia. Follows commands well and able to answer questions appropriately. Mood and affect normal.    Cranial Nerves: PERRL, EOMI, visual field intact, V1-V3 intact, no gross facial asymmetry, tongue/uvula midline. mild painful eye movements on right eye when she looks on left upper side      Motor Exam: No drift  Right upper extremity: 5/5  Left upper extremity: 5/5  Right lower extremity: 5/5  Left lower extremity: 5/5    Normal bulk/tone    Sensory: Intact to light touch and pinprick bilaterally. No extinction    Coordination: Finger to nose intact bilaterally     Gait: Normal      Reflexes: Bilateral 2+ Biceps, Brachial, Patellar, Ankle  Plantar: Down bilateral    GENERAL: No acute distress  HEENT:  Normocephalic, atraumatic  EXTREMITIES: No edema, clubbing, cyanosis  MUSCULOSKELETAL: Normal range of motion  SKIN: No rashes    LABS:  CBC Full  -  ( 27 Nov 2018 08:01 )  WBC Count : 6.46 K/uL  Hemoglobin : 12.9 g/dL  Hematocrit : 38.8 %  Platelet Count - Automated : 206 K/uL  Mean Cell Volume : 87.6 fl  Mean Cell Hemoglobin : 29.1 pg  Mean Cell Hemoglobin Concentration : 33.2 gm/dL  Auto Neutrophil # : x  Auto Lymphocyte # : x  Auto Monocyte # : x  Auto Eosinophil # : x  Auto Basophil # : x  Auto Neutrophil % : x  Auto Lymphocyte % : x  Auto Monocyte % : x  Auto Eosinophil % : x  Auto Basophil % : x      11-27    140  |  103  |  11  ----------------------------<  84  3.8   |  24  |  0.79    Ca    9.2      27 Nov 2018 07:04  Phos  3.7     11-27  Mg     2.1     11-27    TPro  8.1  /  Alb  4.4  /  TBili  0.3  /  DBili  x   /  AST  18  /  ALT  27  /  AlkPhos  76  11-26    LIVER FUNCTIONS - ( 26 Nov 2018 16:08 )  Alb: 4.4 g/dL / Pro: 8.1 g/dL / ALK PHOS: 76 U/L / ALT: 27 U/L DA / AST: 18 U/L / GGT: x           Hemoglobin A1C:       PT/INR - ( 26 Nov 2018 16:08 )   PT: 12.8 sec;   INR: 1.15 ratio         PTT - ( 26 Nov 2018 16:08 )  PTT:34.2 sec    RADIOLOGY:  < from: CT Brain Stroke Protocol (11.26.18 @ 16:02) >  EXAM:  CT BRAIN STROKE PROTOCOL                            PROCEDURE DATE:  11/26/2018          INTERPRETATION:  INDICATION:  The visualized right  TECHNIQUE:  A non contrast 2.5mm axial CT study of the brain was   performed from skull base to vertex. Coronal and sagittal reformations   were generated from the axial data.  COMPARISON EXAMINATION:  No prior    FINDINGS:      HEMISPHERES:  No mass or space occupying lesion.  No acute ischemic   changes or hemorrhagic foci are suggested.  VENTRICLES:  Midline and normal in size.  POSTERIOR FOSSA:  The brain stem and cerebellum are unremarkable.  No CP   angle lesion noted.  EXTRACEREBRAL SPACES:  No subdural or epidural collections are noted.  SKULL BASE AND CALVARIUM:  Appears intact.  No fracture or destructive   lesion is identified.  SINUSES AND MASTOIDS:  Clear.  MISCELLANEOUS:  No orbital or suprasellar abnormality noted.      IMPRESSION:    1)  unremarkable CT study of the brain  2)  clear sinuses and mastoids.    Follow-up MR imaging recommended for further assessment of visualized.    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    IMPRESSION:     Patent cervical vasculature. Patent upper aerodigestive tract.    Left thyroid nodule can be better assessed with ultrasound.    < end of copied text > Neurology Consult    Reason for consult:    HPI:  28 F with PMH of ADHD ( on methylphenidate), depression ( on Bupropion), Anxeity ( PRN Lorazepam), asthma ( On Montelukast)  chronic tension headaches who was transferred to [place] ED with visual complaints (kaleidoscope/ zigzag vision in her R eye) and right arm/hand weakness and  numbness lasting for 2.5 hours  x 1 day.     Patient was in her usual state of health while working at her job as  when around 12:20 PM on 11/26, she suddenly started experiencing kaleidoscope/ zigzag vision in her R eye. She had no associated eye pain, trauma, preceding headache. The kaleidoscope vision then transitioned to complete vision loss in right eye for 2 mins but then resolved. She denies sensation of floaters or  curtain sign. During this episode, endorsing having difficulty holding things in her R hand as well as mild dizziness.  Also patient's student also reported she was saying things that did no make sense. This all weakness lasted for around 2 hours and resolved at around at about 2.30pm-3pm  Since yesterday she had 6-7 episodes of kaleidoscope/ zigzag vision in her R eye. lasting for around 30mins associated with headaches. She describes her headaches as sharp 8-9/10 behind right eye.   She also complains of pain in her right eye when she looks up and on left.   She also complains of neck pain behind her ear radiating to back     Patient has had chronic debilitating headaches with photophobia and nausea but has not been on migraine regimen, never needed ED visits. She otherewise denies any preceding fevers, chest pain, palpitations, tracel or sick contacts. Due to persistence of sx, patient sought care at [place] ED. Initial VS T 98.6, , BP: 136/89 and 97% on RA. Labs  unremarkable. There she was briefly seen by neurology who recommended transfer to [facility] for evaluation by ophtho. She had undergone CT brain stroke protocol at  ED which was unremarkable. Here patient was evaluated by dilated exam by optho which was also within normal limits. Currently patient reports that there is intermittent episodes of the kaleidoscope vision" but overall without further episodes of eye pain or complete vision loss. SHe denied any form of illicit drug use. She has no known FHx of CVA or autoimmune diseases, except hypothyroidism as she is adopted     REVIEW OF SYSTEMS:  Constitutional: No fever, chills, fatigue, weakness.  Eyes: No eye pain, visual disturbances, or discharge.   ENT:  No difficulty hearing, tinnitus, vertigo. No sinus or throat pain.  Neck: No pain or stiffness.  Respiratory: No cough, dyspnea, wheezing.  Cardiovascular: No chest pain, palpitations.  Gastrointestinal: No abdominal pain. No nausea, vomiting, diarrhea, or constipation.   Genitourinary: No dysuria, frequency, hematuria or incontinence.  Neurological: + headaches, + right occipital tenderness No lightheadedness, vertigo, numbness or tremors.  Psychiatric: No depression, anxiety, mood swings, or difficulty sleeping.  Musculoskeletal: No joint pain or swelling. No muscle, back, or extremity pain.  Skin: No itching, burning, rashes or lesions.   Lymph Nodes: No enlarged glands  Endocrine: No heat or cold intolerance, no hair loss.  Allergy and Immunologic: No hives or eczema.    MEDICATIONS  ALBUTerol    90 MICROgram(s) HFA Inhaler 2 Puff(s) Inhalation every 6 hours PRN  buPROPion XL . 150 milliGRAM(s) Oral daily  loratadine 10 milliGRAM(s) Oral daily  methylphenidate ER (CONCERTA) 54 milliGRAM(s) Oral <User Schedule>  montelukast 10 milliGRAM(s) Oral daily    PMH: Asthma  Depression  ADHD  No pertinent past medical history     PSH: No significant past surgical history  No significant past surgical history    FAMILY HISTORY:  No pertinent family history in first degree relatives    No history of dementia, strokes, or seizures     SOCIAL HISTORY:  No history of tobacco or alcohol use     Allergies  No Known Allergies    GENERAL EXAM:  Constitutional: no acute distress, well nourished, well developed   Cardiovascular: RRR, S1/2 normal, no MRG  Musculoskeletal:  no clubbing or cyanosis of the fingernails, no joint swelling, no myalgia  Eyes:  normal sclera and conjunctiva and pupils, no LEANN, no APD. Fundi: no papilledema.    Vital Signs Last 24 Hrs  T(C): 36.8 (27 Nov 2018 05:17), Max: 37 (26 Nov 2018 15:17)  T(F): 98.2 (27 Nov 2018 05:17), Max: 98.6 (26 Nov 2018 15:17)  HR: 73 (27 Nov 2018 05:17) (73 - 109)  BP: 104/66 (27 Nov 2018 05:17) (104/66 - 136/89)  RR: 18 (27 Nov 2018 05:17) (16 - 18)  SpO2: 99% (27 Nov 2018 05:17) (97% - 100%)    Neurological Examination:    Mental Status: Patient is alert, awake, oriented X3. Patient is fluent, no dysarthria, no aphasia. Follows commands well and able to answer questions appropriately. Mood and affect normal.    Cranial Nerves: PERRL, EOMI, visual field intact, V1-V3 intact, no gross facial asymmetry, tongue/uvula midline. mild painful eye movements on right eye when she looks on left upper side      Motor Exam: No drift  Right upper extremity: 5/5  Left upper extremity: 5/5  Right lower extremity: 5/5  Left lower extremity: 5/5    Normal bulk/tone    Sensory: Intact to light touch and pinprick bilaterally. No extinction    Coordination: Finger to nose intact bilaterally     Gait: Normal      Reflexes: Bilateral 2+ Biceps, Brachial, Patellar, Ankle  Plantar: Down bilateral    GENERAL: No acute distress  HEENT:  Normocephalic, atraumatic  EXTREMITIES: No edema, clubbing, cyanosis  MUSCULOSKELETAL: Normal range of motion  SKIN: No rashes    LABS:  CBC Full  -  ( 27 Nov 2018 08:01 )  WBC Count : 6.46 K/uL  Hemoglobin : 12.9 g/dL  Hematocrit : 38.8 %  Platelet Count - Automated : 206 K/uL  Mean Cell Volume : 87.6 fl  Mean Cell Hemoglobin : 29.1 pg  Mean Cell Hemoglobin Concentration : 33.2 gm/dL  Auto Neutrophil # : x  Auto Lymphocyte # : x  Auto Monocyte # : x  Auto Eosinophil # : x  Auto Basophil # : x  Auto Neutrophil % : x  Auto Lymphocyte % : x  Auto Monocyte % : x  Auto Eosinophil % : x  Auto Basophil % : x      11-27    140  |  103  |  11  ----------------------------<  84  3.8   |  24  |  0.79    Ca    9.2      27 Nov 2018 07:04  Phos  3.7     11-27  Mg     2.1     11-27    TPro  8.1  /  Alb  4.4  /  TBili  0.3  /  DBili  x   /  AST  18  /  ALT  27  /  AlkPhos  76  11-26    LIVER FUNCTIONS - ( 26 Nov 2018 16:08 )  Alb: 4.4 g/dL / Pro: 8.1 g/dL / ALK PHOS: 76 U/L / ALT: 27 U/L DA / AST: 18 U/L / GGT: x           PT/INR - ( 26 Nov 2018 16:08 )   PT: 12.8 sec;   INR: 1.15 ratio    PTT - ( 26 Nov 2018 16:08 )  PTT:34.2 sec    RADIOLOGY:  < from: CT Brain Stroke Protocol (11.26.18 @ 16:02) >  EXAM:  CT BRAIN STROKE PROTOCOL                          PROCEDURE DATE:  11/26/2018      INTERPRETATION:  INDICATION:  The visualized right  TECHNIQUE:  A non contrast 2.5mm axial CT study of the brain was   performed from skull base to vertex. Coronal and sagittal reformations   were generated from the axial data.  COMPARISON EXAMINATION:  No prior    FINDINGS:      HEMISPHERES:  No mass or space occupying lesion.  No acute ischemic   changes or hemorrhagic foci are suggested.  VENTRICLES:  Midline and normal in size.  POSTERIOR FOSSA:  The brain stem and cerebellum are unremarkable.  No CP   angle lesion noted.  EXTRACEREBRAL SPACES:  No subdural or epidural collections are noted.  SKULL BASE AND CALVARIUM:  Appears intact.  No fracture or destructive   lesion is identified.  SINUSES AND MASTOIDS:  Clear.  MISCELLANEOUS:  No orbital or suprasellar abnormality noted.      IMPRESSION:    1)  unremarkable CT study of the brain  2)  clear sinuses and mastoids.    Follow-up MR imaging recommended for further assessment of visualized.    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    IMPRESSION:     Patent cervical vasculature. Patent upper aerodigestive tract.    Left thyroid nodule can be better assessed with ultrasound.    < end of copied text >

## 2018-11-27 NOTE — PROGRESS NOTE ADULT - SUBJECTIVE AND OBJECTIVE BOX
S; pt seen this AM, says had few episodes of seeing scintillating spots in R eye ON. Currently, denies change in vision OU or visual disturbances    Mood and Affect Appropriate ( x ),  Oriented to Time, Place, and Person x 3 ( x )    Ophthalmology Exam    Visual acuity (cc): 20/20 OU  Pupils: PERRL OU, no APD  Ttono: 15 OU  Extraocular movements (EOMs): Full OU, no diplopia, mild periorbital pain OD with far gaze to left  Confrontational Visual Field (CVF):  Full OU  Color Plates: 12/12 OU    Pen Light Exam (PLE)  External:  Flat OU  Lids/Lashes/Lacrimal Ducts: Flat OU    Sclera/Conjunctiva:  W+Q OU  Cornea: Cl OU  Anterior Chamber: D+F OU   Iris:  Flat OU  Lens:  Cl OU    Fundus Exam: dilated with 1% tropicamide and 2.5% phenylephrine  Approval obtained from primary team for dilation  Patient aware that pupils can remained dilated for at least 4-6 hours  Exam performed with 20D lens    Vitreous: wnl OU  Disc, cup/disc: sharp and pink, 0.3 OU  Macula:  wnl OU  Vessels:  wnl OU  Periphery: wnl OU    Diagnostic Testing: CT head performed at [facility] negative for acute pathology    Assessment:  28 y/ F, PMH ADHD, depression presented to [place] ED with visual complaints and right arm/hand numbness x 1 day. Patient states at 12:30PM yesterday, she experienced a visual disturbance of her eye described as "kaleidoscope appearance" and then two minute episode of complete loss of vision OD, consider TIA vs ocular migraine    Plan:  - FU MRI brain/orbit w/ and w/o contrast  - Rec TIA workup (ECHO, EKG, MRA neck vs carotid duplex)  - Cont workup per neurology/primary team  - Plan d/w pt and primary team    Follow-Up:  Patient should follow up his/her ophthalmologist or in the [facility] within 1 week of discharge  [address] [place] [address].  [place] [address]  [phone]    sdw Dr. Bain (attending) S; pt seen this AM, says had few episodes of seeing scintillating spots in R eye. Currently, denies change in vision OU or visual disturbances    Mood and Affect Appropriate ( x ),  Oriented to Time, Place, and Person x 3 ( x )    Ophthalmology Exam    Visual acuity (cc): 20/20 OU  Pupils: PERRL OU, no APD  Ttono: 15 OU  Extraocular movements (EOMs): Full OU, no diplopia, mild periorbital pain OD with far gaze to left  Confrontational Visual Field (CVF):  Full OU  Color Plates: 12/12 OU    Pen Light Exam (PLE)  External:  Flat OU  Lids/Lashes/Lacrimal Ducts: Flat OU    Sclera/Conjunctiva:  W+Q OU  Cornea: Cl OU  Anterior Chamber: D+F OU   Iris:  Flat OU  Lens:  Cl OU    Fundus Exam: dilated with 1% tropicamide and 2.5% phenylephrine  Approval obtained from primary team for dilation  Patient aware that pupils can remained dilated for at least 4-6 hours  Exam performed with 20D lens    Vitreous: wnl OU  Disc, cup/disc: sharp and pink, 0.3 OU  Macula:  wnl OU  Vessels:  wnl OU  Periphery: wnl OU    Diagnostic Testing: CT head performed at [facility] negative for acute pathology    Assessment:  28 y/ F, PMH ADHD, depression presented to [facility] ED with visual complaints and right arm/hand numbness x 1 day. Patient states at 12:30PM yesterday, she experienced a visual disturbance of her eye described as "kaleidoscope appearance" and then two minute episode of complete loss of vision OD, consider TIA vs migraine with aura.    Plan:  - FU MRI brain/orbit w/ and w/o contrast  - Rec TIA workup (ECHO, EKG, MRA neck vs carotid duplex)  - Cont workup per neurology/primary team  - Plan d/w pt and primary team  - case discussed with Dr. Padron    Follow-Up:  Patient should follow up his/her ophthalmologist or in the [facility] within 1 week of discharge  [address] [place] [id number]  [phone]    sdw Dr. Bain (attending)

## 2018-11-28 ENCOUNTER — TRANSCRIPTION ENCOUNTER (OUTPATIENT)
Age: 28
End: 2018-11-28

## 2018-11-28 VITALS
SYSTOLIC BLOOD PRESSURE: 123 MMHG | TEMPERATURE: 99 F | HEART RATE: 99 BPM | DIASTOLIC BLOOD PRESSURE: 77 MMHG | RESPIRATION RATE: 18 BRPM | OXYGEN SATURATION: 99 %

## 2018-11-28 LAB
ANION GAP SERPL CALC-SCNC: 13 MMOL/L — SIGNIFICANT CHANGE UP (ref 5–17)
BUN SERPL-MCNC: 12 MG/DL — SIGNIFICANT CHANGE UP (ref 7–23)
CALCIUM SERPL-MCNC: 9.5 MG/DL — SIGNIFICANT CHANGE UP (ref 8.4–10.5)
CHLORIDE SERPL-SCNC: 103 MMOL/L — SIGNIFICANT CHANGE UP (ref 96–108)
CO2 SERPL-SCNC: 23 MMOL/L — SIGNIFICANT CHANGE UP (ref 22–31)
CREAT SERPL-MCNC: 0.88 MG/DL — SIGNIFICANT CHANGE UP (ref 0.5–1.3)
GLUCOSE SERPL-MCNC: 87 MG/DL — SIGNIFICANT CHANGE UP (ref 70–99)
HCT VFR BLD CALC: 39.1 % — SIGNIFICANT CHANGE UP (ref 34.5–45)
HGB BLD-MCNC: 13.2 G/DL — SIGNIFICANT CHANGE UP (ref 11.5–15.5)
MCHC RBC-ENTMCNC: 28.8 PG — SIGNIFICANT CHANGE UP (ref 27–34)
MCHC RBC-ENTMCNC: 33.8 GM/DL — SIGNIFICANT CHANGE UP (ref 32–36)
MCV RBC AUTO: 85.2 FL — SIGNIFICANT CHANGE UP (ref 80–100)
PLATELET # BLD AUTO: 212 K/UL — SIGNIFICANT CHANGE UP (ref 150–400)
POTASSIUM SERPL-MCNC: 4.4 MMOL/L — SIGNIFICANT CHANGE UP (ref 3.5–5.3)
POTASSIUM SERPL-SCNC: 4.4 MMOL/L — SIGNIFICANT CHANGE UP (ref 3.5–5.3)
RBC # BLD: 4.59 M/UL — SIGNIFICANT CHANGE UP (ref 3.8–5.2)
RBC # FLD: 13.1 % — SIGNIFICANT CHANGE UP (ref 10.3–14.5)
SODIUM SERPL-SCNC: 139 MMOL/L — SIGNIFICANT CHANGE UP (ref 135–145)
WBC # BLD: 6.25 K/UL — SIGNIFICANT CHANGE UP (ref 3.8–10.5)
WBC # FLD AUTO: 6.25 K/UL — SIGNIFICANT CHANGE UP (ref 3.8–10.5)

## 2018-11-28 PROCEDURE — 83735 ASSAY OF MAGNESIUM: CPT

## 2018-11-28 PROCEDURE — 80048 BASIC METABOLIC PNL TOTAL CA: CPT

## 2018-11-28 PROCEDURE — 83036 HEMOGLOBIN GLYCOSYLATED A1C: CPT

## 2018-11-28 PROCEDURE — 70544 MR ANGIOGRAPHY HEAD W/O DYE: CPT

## 2018-11-28 PROCEDURE — G0378: CPT

## 2018-11-28 PROCEDURE — A9585: CPT

## 2018-11-28 PROCEDURE — 70548 MR ANGIOGRAPHY NECK W/DYE: CPT

## 2018-11-28 PROCEDURE — 86140 C-REACTIVE PROTEIN: CPT

## 2018-11-28 PROCEDURE — 70553 MRI BRAIN STEM W/O & W/DYE: CPT

## 2018-11-28 PROCEDURE — 85652 RBC SED RATE AUTOMATED: CPT

## 2018-11-28 PROCEDURE — 84100 ASSAY OF PHOSPHORUS: CPT

## 2018-11-28 PROCEDURE — 85027 COMPLETE CBC AUTOMATED: CPT

## 2018-11-28 PROCEDURE — 99239 HOSP IP/OBS DSCHRG MGMT >30: CPT

## 2018-11-28 PROCEDURE — 93005 ELECTROCARDIOGRAM TRACING: CPT

## 2018-11-28 PROCEDURE — 99232 SBSQ HOSP IP/OBS MODERATE 35: CPT

## 2018-11-28 PROCEDURE — 80061 LIPID PANEL: CPT

## 2018-11-28 RX ORDER — LORATADINE 10 MG/1
1 TABLET ORAL
Qty: 0 | Refills: 0 | COMMUNITY
Start: 2018-11-28

## 2018-11-28 RX ORDER — LORATADINE 10 MG/1
1 TABLET ORAL
Qty: 0 | Refills: 0 | COMMUNITY

## 2018-11-28 RX ORDER — ACETAMINOPHEN 500 MG
650 TABLET ORAL EVERY 6 HOURS
Qty: 0 | Refills: 0 | Status: DISCONTINUED | OUTPATIENT
Start: 2018-11-28 | End: 2018-11-28

## 2018-11-28 RX ADMIN — Medication 650 MILLIGRAM(S): at 09:39

## 2018-11-28 RX ADMIN — Medication 54 MILLIGRAM(S): at 06:31

## 2018-11-28 RX ADMIN — Medication 650 MILLIGRAM(S): at 10:30

## 2018-11-28 NOTE — DISCHARGE NOTE ADULT - MEDICATION SUMMARY - MEDICATIONS TO TAKE
I will START or STAY ON the medications listed below when I get home from the hospital:    loratadine 10 mg oral tablet  -- 1 tab(s) by mouth once a day  -- Indication: For Asthma    Breo Ellipta 100 mcg-25 mcg/inh inhalation powder  -- 1 puff(s) inhaled once a day  -- Indication: For inhaler    albuterol 90 mcg/inh inhalation aerosol  -- 2 puff(s) inhaled 4 times a day, As Needed  -- Indication: For inhaler    methylphenidate 54 mg/24 hr oral tablet, extended release  -- 1 tab(s) by mouth once a day (in the morning)  -- Indication: For mood    montelukast 10 mg oral tablet  -- 1 tab(s) by mouth once a day  -- Indication: For Asthma    buPROPion 150 mg/24 hours (XL) oral tablet, extended release  -- 1 tab(s) by mouth every 24 hours  -- Indication: For mood

## 2018-11-28 NOTE — DISCHARGE NOTE ADULT - ADDITIONAL INSTRUCTIONS
follow up with your Primary care doctor  Follow up with Neurology Clinic- [phone] call for appointment follow up with your Primary care doctor within one week  Follow up with Neurology Clinic- [phone], Please call for appointment

## 2018-11-28 NOTE — DISCHARGE NOTE ADULT - HOSPITAL COURSE
28 F with PMH of ADHD, depression, asthma chronic headaches who was transferred to [place] ED with visual complaints and right arm/hand numbness x 1 day, found to have an unremarkable CT head imaging and opthalmologic evaluation, raising concerning for underlying demyelinating disease vs migraine etiologies vs TIA vs vasculitis 28 F with PMH of ADHD ( on methylphenidate), depression ( on Bupropion), Anxeity ( PRN Lorazepam), asthma ( On Montelukast)  chronic tension headaches who was transferred to [place] ED with visual complaints (kaleidoscope/ zigzag vision in her R eye) and right arm/hand weakness and numbness and confusion lasting for 2.5 hours. Associated with pain in her right eye when she looks up and on left and tenderness over right occipital region with neck pain behind her ear radiating to back. Otherwise normal non focal exam. CT scan normal. Opthalmology saw her in ed and normal exam. MRI brain/MRA normal. 28 F with PMH of ADHD ( on methylphenidate), depression ( on Bupropion), Anxeity ( PRN Lorazepam), asthma ( On Montelukast)  chronic tension headaches who was transferred to [place] ED with visual complaints (kaleidoscope/ zigzag vision in her R eye) and right arm/hand weakness and numbness and confusion lasting for 2.5 hours. Associated with pain in her right eye when she looks up and on left and tenderness over right occipital region with neck pain behind her ear radiating to back.   Seen by Opthalmology: normal exam.   Seen by Neurology likely Migraine with Aura. Otherwise normal non focal exam.    Brain and orbital MRI: No abnormality is noted.    Brain MRA: No evidence for stenosis, major vessel occlusion, or aneurysm about the [place].      Neck MRA: No evidence for carotid or vertebral artery stenosis or dissection. 28 F with PMH of ADHD ( on methylphenidate), depression ( on Bupropion), Anxeity ( PRN Lorazepam), asthma ( On Montelukast)  chronic tension headaches who was transferred to [place] ED with visual complaints (kaleidoscope/ zigzag vision in her R eye) and right arm/hand weakness and numbness and confusion lasting for 2.5 hours. Associated with pain in her right eye when she looks up and on left and tenderness over right occipital region with neck pain behind her ear radiating to back. CT head neg for acute hemorrhage.  Seen by Opthalmology: normal exam.   Seen by Neurology: Likely Migraine with Aura. Otherwise normal non focal exam.     Brain and orbital MRI: No abnormality is noted.   Brain MRA: No evidence for stenosis, major vessel occlusion, or aneurysm about the [place].    Neck MRA: No evidence for carotid or vertebral artery stenosis or dissection. 28 F with PMH of ADHD ( on methylphenidate), depression ( on Bupropion), Anxeity ( PRN Lorazepam), asthma ( On Montelukast)  chronic tension headaches who was transferred to [place] ED with visual complaints (kaleidoscope/ zigzag vision in her R eye) and right arm/hand weakness and numbness and confusion lasting for 2.5 hours. Associated with pain in her right eye when she looks up and on left and tenderness over right occipital region with neck pain behind her ear radiating to back. CT head neg for acute hemorrhage.  Seen by Opthalmology: normal exam.   Seen by Neurology: Likely Migraine with Aura. Otherwise normal non focal exam.     Brain and orbital MRI: No abnormality is noted.   Brain MRA: No evidence for stenosis, major vessel occlusion, or aneurysm about the [place].    Neck MRA: No evidence for carotid or vertebral artery stenosis or dissection.  Pt medically cleared for dc with f/u w/ Neurology.

## 2018-11-28 NOTE — DISCHARGE NOTE ADULT - PATIENT PORTAL LINK FT
You can access the [facility] Patient Portal, offered by [facility], by registering with the following website: [email or web address]

## 2018-11-28 NOTE — CHART NOTE - NSCHARTNOTEFT_GEN_A_CORE
patient seen and examined. patient had headache this morning relieved with tylenol   seen by neurology     MRI brain reviewed- 1. Brain and orbital MRI: No abnormality is noted.  2. Brain MRA: No evidence for stenosis, major vessel occlusion, or   aneurysm about the [place].  3. Neck MRA: No evidence for carotid or vertebral artery stenosis or   dissection.    likely migraine headache - per neurology, continue tylenol for headache and follow up with neurology outpatient  discharge time 36 minutes  Dr. M. Luke  [facility] Hospitalist  [phone]

## 2018-11-28 NOTE — PROGRESS NOTE ADULT - SUBJECTIVE AND OBJECTIVE BOX
Neurology Consult    Interval History (11/28/2018): Patient seen and examined at bedside. Patient currently has no complaints. States headache is resolving. Admits to light sensitivity. Denies any numbness, tingling, unilateral weakness.     HPI:  28 F with PMH of ADHD ( on methylphenidate), depression ( on Bupropion), Anxeity ( PRN Lorazepam), asthma ( On Montelukast)  chronic tension headaches who was transferred to [place] ED with visual complaints (kaleidoscope/ zigzag vision in her R eye) and right arm/hand weakness and  numbness lasting for 2.5 hours  x 1 day.   Patient was in her usual state of health while working at her job as  when around 12:20 PM on 11/26, she suddenly started experiencing kaleidoscope/ zigzag vision in her R eye. She had no associated eye pain, trauma, preceding headache. The kaleidoscope vision then transitioned to complete vision loss in right eye for 2 mins but then resolved. She denies sensation of floaters or  curtain sign. During this episode, endorsing having difficulty holding things in her R hand as well as mild dizziness.  Also patient's student also reported she was saying things that did no make sense. This all weakness lasted for around 2 hours and resolved at around at about 2.30pm-3pm  Since yesterday she had 6-7 episodes of kaleidoscope/ zigzag vision in her R eye. lasting for around 30mins associated with headaches. She describes her headaches as sharp 8-9/10 behind right eye.   She also complains of pain in her right eye when she looks up and on left.   She also complains of neck pain behind her ear radiating to back   Patient has had chronic debilitating headaches with photophobia and nausea but has not been on migraine regimen, never needed ED visits. She otherewise denies any preceding fevers, chest pain, palpitations, tracel or sick contacts. Due to persistence of sx, patient sought care at [place] ED. Initial VS T 98.6, , BP: 136/89 and 97% on RA. Labs  unremarkable. There she was briefly seen by neurology who recommended transfer to [facility] for evaluation by ophtho. She had undergone CT brain stroke protocol at  ED which was unremarkable. Here patient was evaluated by dilated exam by optho which was also within normal limits. Currently patient reports that there is intermittent episodes of the kaleidoscope vision" but overall without further episodes of eye pain or complete vision loss. SHe denied any form of illicit drug use. She has no known FHx of CVA or autoimmune diseases, except hypothyroidism as she is adopted     REVIEW OF SYSTEMS:  As per HPI, otherwise negative.     MEDICATIONS  ALBUTerol    90 MICROgram(s) HFA Inhaler 2 Puff(s) Inhalation every 6 hours PRN  buPROPion XL . 150 milliGRAM(s) Oral daily  loratadine 10 milliGRAM(s) Oral daily  methylphenidate ER (CONCERTA) 54 milliGRAM(s) Oral <User Schedule>  montelukast 10 milliGRAM(s) Oral daily    PMH: Asthma  Depression  ADHD  No pertinent past medical history     PSH: No significant past surgical history  No significant past surgical history    FAMILY HISTORY:  No pertinent family history in first degree relatives    No history of dementia, strokes, or seizures     SOCIAL HISTORY:  No history of tobacco or alcohol use     Allergies  No Known Allergies    GENERAL EXAM:  Constitutional: no acute distress, well nourished, well developed   Cardiovascular: RRR, S1/2 normal, no MRG  Musculoskeletal:  no clubbing or cyanosis of the fingernails, no joint swelling, no myalgia  Eyes:  normal sclera and conjunctiva and pupils, no LEANN, no APD. Fundi: no papilledema.    Vital Signs Last 24 Hrs  T(C): 36.8 (28 Nov 2018 05:39), Max: 36.9 (27 Nov 2018 16:00)  T(F): 98.3 (28 Nov 2018 05:39), Max: 98.4 (27 Nov 2018 16:00)  HR: 71 (28 Nov 2018 05:39) (71 - 95)  BP: 116/76 (28 Nov 2018 05:39) (116/76 - 125/84)  BP(mean): --  RR: 16 (28 Nov 2018 05:39) (16 - 18)  SpO2: 97% (28 Nov 2018 05:39) (97% - 99%)    Neurological Examination:    Mental Status: Patient is alert, awake, oriented X3. Patient is fluent, no dysarthria, no aphasia. Follows commands well and able to answer questions appropriately. Mood and affect normal.    Cranial Nerves: PERRL, EOMI, visual field intact, V1-V3 intact, no gross facial asymmetry, tongue/uvula midline.     Motor Exam: No drift  Right upper extremity: 5/5  Left upper extremity: 5/5  Right lower extremity: 5/5  Left lower extremity: 5/5    Normal bulk/tone    Sensory: Intact to light touch and pinprick bilaterally. No extinction    Coordination: Finger to nose intact bilaterally     Gait: Normal      Reflexes: Bilateral 2+ Biceps, Brachial, Patellar, Ankle  Plantar: Down bilateral    GENERAL: No acute distress  HEENT:  Normocephalic, atraumatic  EXTREMITIES: No edema, clubbing, cyanosis  MUSCULOSKELETAL: Normal range of motion  SKIN: No rashes    LABS:  CBC Full  -  ( 27 Nov 2018 08:01 )  WBC Count : 6.46 K/uL  Hemoglobin : 12.9 g/dL  Hematocrit : 38.8 %  Platelet Count - Automated : 206 K/uL  Mean Cell Volume : 87.6 fl  Mean Cell Hemoglobin : 29.1 pg  Mean Cell Hemoglobin Concentration : 33.2 gm/dL  Auto Neutrophil # : x  Auto Lymphocyte # : x  Auto Monocyte # : x  Auto Eosinophil # : x  Auto Basophil # : x  Auto Neutrophil % : x  Auto Lymphocyte % : x  Auto Monocyte % : x  Auto Eosinophil % : x  Auto Basophil % : x      11-27    140  |  103  |  11  ----------------------------<  84  3.8   |  24  |  0.79    Ca    9.2      27 Nov 2018 07:04  Phos  3.7     11-27  Mg     2.1     11-27    TPro  8.1  /  Alb  4.4  /  TBili  0.3  /  DBili  x   /  AST  18  /  ALT  27  /  AlkPhos  76  11-26    LIVER FUNCTIONS - ( 26 Nov 2018 16:08 )  Alb: 4.4 g/dL / Pro: 8.1 g/dL / ALK PHOS: 76 U/L / ALT: 27 U/L DA / AST: 18 U/L / GGT: x           PT/INR - ( 26 Nov 2018 16:08 )   PT: 12.8 sec;   INR: 1.15 ratio    PTT - ( 26 Nov 2018 16:08 )  PTT:34.2 sec    RADIOLOGY:  < from: CT Brain Stroke Protocol (11.26.18 @ 16:02) >  EXAM:  CT BRAIN STROKE PROTOCOL                          PROCEDURE DATE:  11/26/2018      INTERPRETATION:  INDICATION:  The visualized right  TECHNIQUE:  A non contrast 2.5mm axial CT study of the brain was   performed from skull base to vertex. Coronal and sagittal reformations   were generated from the axial data.  COMPARISON EXAMINATION:  No prior    FINDINGS:      HEMISPHERES:  No mass or space occupying lesion.  No acute ischemic   changes or hemorrhagic foci are suggested.  VENTRICLES:  Midline and normal in size.  POSTERIOR FOSSA:  The brain stem and cerebellum are unremarkable.  No CP   angle lesion noted.  EXTRACEREBRAL SPACES:  No subdural or epidural collections are noted.  SKULL BASE AND CALVARIUM:  Appears intact.  No fracture or destructive   lesion is identified.  SINUSES AND MASTOIDS:  Clear.  MISCELLANEOUS:  No orbital or suprasellar abnormality noted.      IMPRESSION:    1)  unremarkable CT study of the brain  2)  clear sinuses and mastoids.    Follow-up MR imaging recommended for further assessment of visualized.    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    IMPRESSION:     Patent cervical vasculature. Patent upper aerodigestive tract.    Left thyroid nodule can be better assessed with ultrasound.    < end of copied text >    < from: MR Angio Head No Cont (11.27.18 @ 22:12) >  IMPRESSION:    1. Brain and orbital MRI: No abnormality is noted.  2. Brain MRA: No evidence for stenosis, major vessel occlusion, or   aneurysm about the [place].  3. Neck MRA: No evidence for carotid or vertebral artery stenosis or   dissection.      < end of copied text > Neurology Consult    Interval History (11/28/2018): Patient seen and examined at bedside. Patient currently has no complaints. States headache is resolving. Admits to light sensitivity. Denies any numbness, tingling, unilateral weakness.     HPI:  28 F with PMH of ADHD ( on methylphenidate), depression ( on Bupropion), Anxeity ( PRN Lorazepam), asthma ( On Montelukast)  chronic tension headaches who was transferred to [place] ED with visual complaints (kaleidoscope/ zigzag vision in her R eye) and right arm/hand weakness and  numbness lasting for 2.5 hours  x 1 day.   Patient was in her usual state of health while working at her job as  when around 12:20 PM on 11/26, she suddenly started experiencing kaleidoscope/ zigzag vision in her R eye. She had no associated eye pain, trauma, preceding headache. The kaleidoscope vision then transitioned to complete vision loss in right eye for 2 mins but then resolved. She denies sensation of floaters or  curtain sign. During this episode, endorsing having difficulty holding things in her R hand as well as mild dizziness.  Also patient's student also reported she was saying things that did no make sense. This all weakness lasted for around 2 hours and resolved at around at about 2.30pm-3pm  Since yesterday she had 6-7 episodes of kaleidoscope/ zigzag vision in her R eye. lasting for around 30mins associated with headaches. She describes her headaches as sharp 8-9/10 behind right eye.   She also complains of pain in her right eye when she looks up and on left.   She also complains of neck pain behind her ear radiating to back   Patient has had chronic debilitating headaches with photophobia and nausea but has not been on migraine regimen, never needed ED visits. She otherewise denies any preceding fevers, chest pain, palpitations, tracel or sick contacts. Due to persistence of sx, patient sought care at [place] ED. Initial VS T 98.6, , BP: 136/89 and 97% on RA. Labs  unremarkable. There she was briefly seen by neurology who recommended transfer to [facility] for evaluation by ophtho. She had undergone CT brain stroke protocol at  ED which was unremarkable. Here patient was evaluated by dilated exam by optho which was also within normal limits. Currently patient reports that there is intermittent episodes of the kaleidoscope vision" but overall without further episodes of eye pain or complete vision loss. SHe denied any form of illicit drug use. She has no known FHx of CVA or autoimmune diseases, except hypothyroidism as she is adopted     Subjective: symptoms improved since admission, no recurrrence      REVIEW OF SYSTEMS:  As per HPI, otherwise negative.     MEDICATIONS  ALBUTerol    90 MICROgram(s) HFA Inhaler 2 Puff(s) Inhalation every 6 hours PRN  buPROPion XL . 150 milliGRAM(s) Oral daily  loratadine 10 milliGRAM(s) Oral daily  methylphenidate ER (CONCERTA) 54 milliGRAM(s) Oral <User Schedule>  montelukast 10 milliGRAM(s) Oral daily    PMH: Asthma  Depression  ADHD  No pertinent past medical history     PSH: No significant past surgical history  No significant past surgical history    FAMILY HISTORY:  No pertinent family history in first degree relatives    No history of dementia, strokes, or seizures     SOCIAL HISTORY:  No history of tobacco or alcohol use     Allergies  No Known Allergies    GENERAL EXAM:  Constitutional: no acute distress, well nourished, well developed   Cardiovascular: RRR, S1/2 normal, no MRG  Musculoskeletal:  no clubbing or cyanosis of the fingernails, no joint swelling, no myalgia  Eyes:  normal sclera and conjunctiva and pupils, no LEANN, no APD. Fundi: no papilledema.    Vital Signs Last 24 Hrs  T(C): 36.8 (28 Nov 2018 05:39), Max: 36.9 (27 Nov 2018 16:00)  T(F): 98.3 (28 Nov 2018 05:39), Max: 98.4 (27 Nov 2018 16:00)  HR: 71 (28 Nov 2018 05:39) (71 - 95)  BP: 116/76 (28 Nov 2018 05:39) (116/76 - 125/84)  BP(mean): --  RR: 16 (28 Nov 2018 05:39) (16 - 18)  SpO2: 97% (28 Nov 2018 05:39) (97% - 99%)    Neurological Examination:    Mental Status: Patient is alert, awake, oriented X3. Patient is fluent, no dysarthria, no aphasia. Follows commands well and able to answer questions appropriately. Mood and affect normal.    Cranial Nerves: PERRL, EOMI, visual field intact, V1-V3 intact, no gross facial asymmetry, tongue/uvula midline.     Motor Exam: No drift  Right upper extremity: 5/5  Left upper extremity: 5/5  Right lower extremity: 5/5  Left lower extremity: 5/5    Normal bulk/tone    Sensory: Intact to light touch and pinprick bilaterally. No extinction    Coordination: Finger to nose intact bilaterally     Gait: Normal      Reflexes: Bilateral 2+ Biceps, Brachial, Patellar, Ankle  Plantar: Down bilateral    GENERAL: No acute distress  HEENT:  Normocephalic, atraumatic  EXTREMITIES: No edema, clubbing, cyanosis  MUSCULOSKELETAL: Normal range of motion  SKIN: No rashes    LABS:  CBC Full  -  ( 27 Nov 2018 08:01 )  WBC Count : 6.46 K/uL  Hemoglobin : 12.9 g/dL  Hematocrit : 38.8 %  Platelet Count - Automated : 206 K/uL  Mean Cell Volume : 87.6 fl  Mean Cell Hemoglobin : 29.1 pg  Mean Cell Hemoglobin Concentration : 33.2 gm/dL  Auto Neutrophil # : x  Auto Lymphocyte # : x  Auto Monocyte # : x  Auto Eosinophil # : x  Auto Basophil # : x  Auto Neutrophil % : x  Auto Lymphocyte % : x  Auto Monocyte % : x  Auto Eosinophil % : x  Auto Basophil % : x      11-27    140  |  103  |  11  ----------------------------<  84  3.8   |  24  |  0.79    Ca    9.2      27 Nov 2018 07:04  Phos  3.7     11-27  Mg     2.1     11-27    TPro  8.1  /  Alb  4.4  /  TBili  0.3  /  DBili  x   /  AST  18  /  ALT  27  /  AlkPhos  76  11-26    LIVER FUNCTIONS - ( 26 Nov 2018 16:08 )  Alb: 4.4 g/dL / Pro: 8.1 g/dL / ALK PHOS: 76 U/L / ALT: 27 U/L DA / AST: 18 U/L / GGT: x           PT/INR - ( 26 Nov 2018 16:08 )   PT: 12.8 sec;   INR: 1.15 ratio    PTT - ( 26 Nov 2018 16:08 )  PTT:34.2 sec    RADIOLOGY:  < from: CT Brain Stroke Protocol (11.26.18 @ 16:02) >  EXAM:  CT BRAIN STROKE PROTOCOL                          PROCEDURE DATE:  11/26/2018      INTERPRETATION:  INDICATION:  The visualized right  TECHNIQUE:  A non contrast 2.5mm axial CT study of the brain was   performed from skull base to vertex. Coronal and sagittal reformations   were generated from the axial data.  COMPARISON EXAMINATION:  No prior    FINDINGS:      HEMISPHERES:  No mass or space occupying lesion.  No acute ischemic   changes or hemorrhagic foci are suggested.  VENTRICLES:  Midline and normal in size.  POSTERIOR FOSSA:  The brain stem and cerebellum are unremarkable.  No CP   angle lesion noted.  EXTRACEREBRAL SPACES:  No subdural or epidural collections are noted.  SKULL BASE AND CALVARIUM:  Appears intact.  No fracture or destructive   lesion is identified.  SINUSES AND MASTOIDS:  Clear.  MISCELLANEOUS:  No orbital or suprasellar abnormality noted.      IMPRESSION:    1)  unremarkable CT study of the brain  2)  clear sinuses and mastoids.    Follow-up MR imaging recommended for further assessment of visualized.    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    IMPRESSION:     Patent cervical vasculature. Patent upper aerodigestive tract.    Left thyroid nodule can be better assessed with ultrasound.    < end of copied text >    < from: MR Angio Head No Cont (11.27.18 @ 22:12) >  IMPRESSION:    1. Brain and orbital MRI: No abnormality is noted.  2. Brain MRA: No evidence for stenosis, major vessel occlusion, or   aneurysm about the [place].  3. Neck MRA: No evidence for carotid or vertebral artery stenosis or   dissection.      < end of copied text >

## 2018-11-28 NOTE — PROGRESS NOTE ADULT - ATTENDING COMMENTS
I have interviewed and examined the patient and reviewed the residents note including the history, exam, assessment, and plan.  I agree with the residents assessment and plan.    28 y/ F, PMH ADHD, depression presented to [place] ED with visual complaints and right arm/hand numbness x 1 day. Patient states at 12:30PM yesterday, she experienced a visual disturbance of her eye described as "kaleidoscope appearance" and then two minute episode of complete loss of vision OD, consider TIA vs migraine with aura.    Plan:  - FU MRI brain/orbit w/ and w/o contrast  - Rec TIA workup (ECHO, EKG, MRA neck vs carotid duplex)  - Cont workup per neurology/primary team  - Plan d/w pt and primary team  - case discussed with Dr. Padron    Follow-Up:  Patient should follow up his/her ophthalmologist or in the [facility] within 1 week of discharge    Phyllis Bain MD
Patient seen and examined.
Dr. NEGIN GómezGenesis Hospitalist  [phone]

## 2018-11-28 NOTE — PROGRESS NOTE ADULT - ASSESSMENT
28 F with PMH of ADHD ( on methylphenidate), depression ( on Bupropion), Anxeity ( PRN Lorazepam), asthma ( On Montelukast)  chronic tension headaches who was transferred to [place] ED with visual complaints (kaleidoscope/ zigzag vision in her R eye) and right arm/hand weakness and numbness and cofusion lasting for 2.5 hours. Associated with pain in her right eye when she looks up and on left and tenderness over right occipital region with neck pain behind her ear radiating to back. Otherwise normal non focal exam. CT scan normal. Opthalmology saw her in ed and normal exam. MRI brain/MRA normal.     Impression : Migraine w/ aura    Recommendations:   [] Follow-up with Neurology outpatient    Plan discussed with Neurology Attending. 28 F with PMH of ADHD ( on methylphenidate), depression ( on Bupropion), Anxeity ( PRN Lorazepam), asthma ( On Montelukast)  chronic tension headaches who was transferred to [place] ED with visual complaints (kaleidoscope/ zigzag vision in her R eye) and right arm/hand weakness and numbness and cofusion lasting for 2.5 hours. Associated with pain in her right eye when she looks up and on left and tenderness over right occipital region with neck pain behind her ear radiating to back. Otherwise normal non focal exam. CT scan normal. Opthalmology saw her in ed and normal exam. MRI brain/MRA normal.     Impression : Migraine w/ aura, resolved    Recommendations:   [] Follow-up with Neurology outpatient    Plan discussed with Neurology Attending.

## 2018-11-28 NOTE — DISCHARGE NOTE ADULT - CARE PLAN
Principal Discharge DX:	Migraine with aura  Goal:	pain control  Assessment and plan of treatment:	Follow up with Neurology as listed below  Secondary Diagnosis:	ADHD (attention deficit hyperactivity disorder)  Assessment and plan of treatment:	Continue with methylphenidate  Secondary Diagnosis:	Anxiety and depression  Assessment and plan of treatment:	Continue with Wellbutrin  Secondary Diagnosis:	Asthma, moderate persistent  Assessment and plan of treatment:	Continue with Inhalers

## 2018-11-28 NOTE — DISCHARGE NOTE ADULT - PLAN OF CARE
pain control Follow up with Neurology as listed below Continue with methylphenidate Continue with Wellbutrin Continue with Inhalers

## 2018-11-30 NOTE — ED PROVIDER NOTE - VASCULAR COMPROMISE
Vitals  Signs   Recorded: 22Oct2018 01:27PM   Blood Pressure: 133 / 79, RUE, Sitting  Heart Rate: 61  Pulse Quality: Regular  O2 Saturation: 98, Non-Rebreather  Recorded: 22Oct2018 01:16PM   Weight: 204 lb 2.32 oz  BMI Calculated: 26.93  BSA Calculated: 2.17    Reason For Visit    Patient presents for a consult HFrEF.   Accompanied By: alone.       Referred By:   Dr Jose Guerrero, pcp via task 8/9/18      History of Present Illness      PMH:  traumatic brain injury from fall at work, c/b seizures (now controlled)  HTN  HFrEF  CKD stage 2/3    PCP: JOSE GUERERRO    Visit History:  10/08/18 - Didn't make med changes as requested. AGAIN INCR Entresto 49/51mg BID, DECR furosemide 20mg daily.  09/21/18 - Labs ordered. INCR Entresto 49/51mg BID. DECR furosemide 20mg daily.  09/07/18 - Labs ordered. DECR furosemide 40mg daily, START Entresto 24/26mg BID.  08/20/18 - Initial Visit. INCR MXL 100mg daily, START jesusita 25mg daily.    Hospitalization History:  9/2018 - [facility] for abnormal EKG - benign, sent home without changes or intervention  8/2018 - [facility], new onset HFrEF    Clinic Weights:  10/22/18 - 204 lbs (+2 lbs)  10/08/18 - 198 lbs (-4 lbs) -- accurate?  09/21/18 - 202 lbs  09/07/18 - 202 lbs (+1 lbs)  08/20/18 - 201 lbs    Pt presents to clinic in NAD. Pt feels well overall w/o complaints. Completed med changes as requested at last visit. Patient reports a continued improvement in energy and dysnpea.    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 Also with daughter who helps patient with his medications.     Fatigue - denies  SOB at rest - denies, improved from last visit  HEALY with ADLs - denies  Blocks before HEALY - 2 or 3 blocks, improved from 1 at initial appointment  Chest pain - denies  Appetite/early satiety - states appetite is good but needs to go to dentist due to broken teeth, limits his eating  Weight gain or loss - gain 2-4 lbs  Palpitations - denies  Dizziness/near  syncope - denies  ICD shocks - wearing life vest today, denies  Peripheral edema - none present today  Orthopnea - sleeps on 1 pillow, no orthopnea  PND - denies  Na and fluid restriction - eats mostly home cooked meals, low salt, doesn't use salt shaker, drinks mostly only water 3-4 bottles per day and maybe a cup of coffee  Ascites - denies  Cool extremities - none today  Last HF hospitalization 8/2018    Med rec: completed on Oct 22, 2018  Med adherence: Adherent, missed 0 doses in last week; low health literacy doesn't understand how pharmacy/clinic communication system works  Took meds today: Y    Home weights: does not own a scale -- cannot afford to purchase one right now  Home BPs: does not own a BP cuff    Exercise/Activity level: light-regular exercise - patient does resistance exercises, says he completes exercises at home daily, sometimes skips exercise to take care of his wife    Immunizations:  Influenza - refuses, states bad reaction in the past   Pneumococcal - refuses    ASCVD Risk:  clinical ASCVD? troponin leak inpatient  age 40-79 years, baseline LDL  mg/dL without diabetes  Last FLP 8/6/18 , , HDL 46, TG 57  10-year ASCVD Risk = 26.7%, optimal 10.9%  current statin: atorvastatin 40mg daily  aspirin 81 mg daily       Past Medical History   1. History of intracranial injury (Z87.820)   2. History of Hospital discharge follow-up (Z09)   3. History of Hypertension, benign (I10)    Surgical History   1. no history of surgery    Family History   1. No pertinent family history    Social History   · Does not use illicit drugs (Z78.9)   · History of crack cocaine use   · Never a smoker   · No alcohol use    Presenting Meds   1. Entresto 49-51 MG Oral Tablet; TAKE 2 TABLETS BY MOUTH TWICE A DAY FOR HEART   FAILURE (DOSE INCREASE);   Therapy: [id number] to (Renew:21Jan2019)  Requested for: 23Oct2018 Recorded   2. Furosemide 20 MG Oral Tablet; TAKE 1 TABLET DAILY (WATER PILL). DOSE    DECREASE;   Therapy: [id number] to (Evaluate:06Apr2019)  Requested for: 08Oct2018; Last   Rx:08Oct2018 Ordered   3. Metoprolol Succinate  MG Oral Tablet Extended Release 24 Hour; TAKE 1 TABLET   BY MOUTH DAILY FOR HEART FAILURE;   Therapy: 08Oct2018 to (Renew:06Jan2019)  Requested for: 08Oct2018; Last   Rx:08Oct2018 Ordered   4. Spironolactone 25 MG Oral Tablet; TAKE 1 TABLET BY MOUTH DAILY FOR HEART   FAILURE;   Therapy: 35Onv3460 to (Evaluate:06Jan2019)  Requested for: 08Oct2018; Last   Rx:08Oct2018 Ordered   5. Aspirin 81 MG Oral Tablet Delayed Release; TAKE 1 TABLET BY MOUTH EVERY DAY;   Therapy: 73Tcy4748 to (Evaluate:16Feb2019)  Requested for: 93Mxu8118; Last   Rx:[id number] Ordered   6. Atorvastatin Calcium 40 MG Oral Tablet; TAKE 1 TABLET daily to prevent heart attack;   Therapy: 69Ggy1399 to (Renew:16Feb2019)  Requested for: 20Aug2018; Last   Rx:[id number] Ordered    Results/Data    Most Recent Cardiac Diagnostics:   TTE 8/6/18 EF 10-15%, grade 2 DD, mild-mod dilated LA, RV function mild to mod reduced, RA normal in size.   Most Recent Lab Results:   [facility] inpatient labs:  8/6/18 Na 144, K 4.0, BUN 14, Cr 1.54, glucose 96, eGFR 49, TSH 1.364  8/5/18 Cr 1.29, eGFR 61, Hgb 15.2, Plt 205, BNP 3762.   66Imv0210 12:01AM   HEMOGLOBIN A1C GLYCOSYLATED   HEMOGLOBIN A1C GLYH: 6.1 % Abnormal High Reference Range 4.5-5.6  08Jjh7763 12:01AM   BASIC METABOLIC PNL   SODIUM: 142 mmol/L Reference Range 135-145  POTASSIUM: 4.2 mmol/L Reference Range 3.4-5.1  BUN: 17 mg/dl Reference Range 6-20  CREATININE: 1.73 mg/dl Abnormal High Reference Range 0.67-1.17  GFR EST. AMER: 43   Assessment   1. ACC/AHA stage C systolic heart failure (I50.20)   2. Congestive heart failure with left ventricular dysfunction (I50.9)   3. Parent refuses immunizations (Z28.82)   4. Benign hypertension with chronic kidney disease, stage II (I12.9,N18.2)    Orders   · BASIC METABOLIC PNL; Status:Resulted - Requires Verification;   Done:  22Oct2018  12:01AM   Performed:ACL; Due:21Nov2018; Marked Important;Ordered; For:ACC/AHA stage C systolic heart failure, Benign hypertension with chronic kidney disease, stage II, Congestive heart failure with left ventricular dysfunction; Ordered By:KRISTIN SOTELO;    Discussion/Summary      Assessment/Plan:    HFrEF, Stage C, NYHA Class II  HTN goal BP <130/80  -Last TTE 8/6/18 EF 10-15%, grade 2 DD  -Currently taking MXL 100mg daily, spironolactone 25mg daily, Entresto 49/51mg BID, furosemide 20mg daily  -- MXL titration limited by low HR  -euvolemic and compensated  -HF symptoms stable, improved  -Recommend   -- BMP - if stable INCR Entresto 97/103mg BID, will call pt with results tomorrow    Provided pt with the following HF education:  -Etiology of HF, medications to avoid and importance of adherence to medications to improve heart function  -Instructed patient to purchase scale when he is able to financially  -Daily weights and to call clinic if gain more than 3-5 lbs in 1 week or less  -Fluid and sodium restriction: salt <2 g/day, fluid <2 L/day    Primary Prevention (?secondary Prevention)  -ASCVD Risk elevated, troponin elevation inpatient (unclear if demand ischemia, pt never had ischemic work-up)  -LDL NOT at goal at least <100, and all other parameters at goal  -Currently taking aspirin 81mg daily, atorvastatin 40mg daily  -Recommend CPM    Pt was provided with an updated medication list with all current medications and instructions on how and what time to take.     Labs:   BMP repeat TODAY   Lipids annually   A1C annually    PharmD Follow-up: 2 WK  PCP Follow-up: Dr. KRISTIN SOTELO 10/8/18  Cardiologist Follow-up: Dr Braun pending repeat TTE and med titration         Signatures   Electronically signed by : ANGELIQUE LIMA, Pharm.D.; Oct 24 2018  5:24PM CST     pulses full and equal bilaterally/no vascular compromise

## 2019-01-03 ENCOUNTER — EMERGENCY (EMERGENCY)
Facility: HOSPITAL | Age: 29
LOS: 1 days | Discharge: HOME | End: 2019-01-03
Attending: STUDENT IN AN ORGANIZED HEALTH CARE EDUCATION/TRAINING PROGRAM
Payer: COMMERCIAL

## 2019-01-03 VITALS — WEIGHT: 130.07 LBS | HEIGHT: 63 IN

## 2019-01-03 VITALS — HEART RATE: 96 BPM

## 2019-01-03 PROBLEM — J45.909 UNSPECIFIED ASTHMA, UNCOMPLICATED: Chronic | Status: ACTIVE | Noted: 2018-11-27

## 2019-01-03 PROBLEM — F32.9 MAJOR DEPRESSIVE DISORDER, SINGLE EPISODE, UNSPECIFIED: Chronic | Status: ACTIVE | Noted: 2018-11-27

## 2019-01-03 PROBLEM — F90.9 ATTENTION-DEFICIT HYPERACTIVITY DISORDER, UNSPECIFIED TYPE: Chronic | Status: ACTIVE | Noted: 2018-11-27

## 2019-01-03 LAB
ALBUMIN SERPL ELPH-MCNC: 4.1 G/DL — SIGNIFICANT CHANGE UP (ref 3.5–5)
ALP SERPL-CCNC: 65 U/L — SIGNIFICANT CHANGE UP (ref 40–120)
ALT FLD-CCNC: 25 U/L DA — SIGNIFICANT CHANGE UP (ref 10–60)
ANION GAP SERPL CALC-SCNC: 12 MMOL/L — SIGNIFICANT CHANGE UP (ref 5–17)
APPEARANCE UR: CLEAR — SIGNIFICANT CHANGE UP
APTT BLD: 32.8 SEC — SIGNIFICANT CHANGE UP (ref 27.5–36.3)
AST SERPL-CCNC: 16 U/L — SIGNIFICANT CHANGE UP (ref 10–40)
BASOPHILS # BLD AUTO: 0 K/UL — SIGNIFICANT CHANGE UP (ref 0–0.2)
BASOPHILS NFR BLD AUTO: 0.7 % — SIGNIFICANT CHANGE UP (ref 0–2)
BILIRUB SERPL-MCNC: 0.3 MG/DL — SIGNIFICANT CHANGE UP (ref 0.2–1.2)
BILIRUB UR-MCNC: NEGATIVE — SIGNIFICANT CHANGE UP
BUN SERPL-MCNC: 8 MG/DL — SIGNIFICANT CHANGE UP (ref 7–18)
CALCIUM SERPL-MCNC: 8.5 MG/DL — SIGNIFICANT CHANGE UP (ref 8.4–10.5)
CHLORIDE SERPL-SCNC: 105 MMOL/L — SIGNIFICANT CHANGE UP (ref 96–108)
CO2 SERPL-SCNC: 21 MMOL/L — LOW (ref 22–31)
COLOR SPEC: YELLOW — SIGNIFICANT CHANGE UP
CREAT SERPL-MCNC: 0.88 MG/DL — SIGNIFICANT CHANGE UP (ref 0.5–1.3)
DIFF PNL FLD: ABNORMAL
EOSINOPHIL # BLD AUTO: 0 K/UL — SIGNIFICANT CHANGE UP (ref 0–0.5)
EOSINOPHIL NFR BLD AUTO: 0.1 % — SIGNIFICANT CHANGE UP (ref 0–6)
FLUAV H3 RNA SPEC QL NAA+PROBE: DETECTED
GLUCOSE SERPL-MCNC: 107 MG/DL — HIGH (ref 70–99)
GLUCOSE UR QL: NEGATIVE — SIGNIFICANT CHANGE UP
HCG SERPL-ACNC: <1 MIU/ML — SIGNIFICANT CHANGE UP
HCT VFR BLD CALC: 39.6 % — SIGNIFICANT CHANGE UP (ref 34.5–45)
HGB BLD-MCNC: 13 G/DL — SIGNIFICANT CHANGE UP (ref 11.5–15.5)
INR BLD: 1.37 RATIO — HIGH (ref 0.88–1.16)
KETONES UR-MCNC: ABNORMAL
LACTATE SERPL-SCNC: 0.8 MMOL/L — SIGNIFICANT CHANGE UP (ref 0.7–2)
LEUKOCYTE ESTERASE UR-ACNC: NEGATIVE — SIGNIFICANT CHANGE UP
LYMPHOCYTES # BLD AUTO: 0.4 K/UL — LOW (ref 1–3.3)
LYMPHOCYTES # BLD AUTO: 9.3 % — LOW (ref 13–44)
MCHC RBC-ENTMCNC: 28.6 PG — SIGNIFICANT CHANGE UP (ref 27–34)
MCHC RBC-ENTMCNC: 32.9 GM/DL — SIGNIFICANT CHANGE UP (ref 32–36)
MCV RBC AUTO: 86.9 FL — SIGNIFICANT CHANGE UP (ref 80–100)
MONOCYTES # BLD AUTO: 0.4 K/UL — SIGNIFICANT CHANGE UP (ref 0–0.9)
MONOCYTES NFR BLD AUTO: 8.4 % — SIGNIFICANT CHANGE UP (ref 2–14)
NEUTROPHILS # BLD AUTO: 3.6 K/UL — SIGNIFICANT CHANGE UP (ref 1.8–7.4)
NEUTROPHILS NFR BLD AUTO: 81.5 % — HIGH (ref 43–77)
NITRITE UR-MCNC: NEGATIVE — SIGNIFICANT CHANGE UP
PH UR: 5 — SIGNIFICANT CHANGE UP (ref 5–8)
PLATELET # BLD AUTO: 182 K/UL — SIGNIFICANT CHANGE UP (ref 150–400)
POTASSIUM SERPL-MCNC: 3.6 MMOL/L — SIGNIFICANT CHANGE UP (ref 3.5–5.3)
POTASSIUM SERPL-SCNC: 3.6 MMOL/L — SIGNIFICANT CHANGE UP (ref 3.5–5.3)
PROT SERPL-MCNC: 7.7 G/DL — SIGNIFICANT CHANGE UP (ref 6–8.3)
PROT UR-MCNC: 30 MG/DL
PROTHROM AB SERPL-ACNC: 15.3 SEC — HIGH (ref 10–12.9)
RAPID RVP RESULT: DETECTED
RBC # BLD: 4.56 M/UL — SIGNIFICANT CHANGE UP (ref 3.8–5.2)
RBC # FLD: 11.5 % — SIGNIFICANT CHANGE UP (ref 10.3–14.5)
SODIUM SERPL-SCNC: 138 MMOL/L — SIGNIFICANT CHANGE UP (ref 135–145)
SP GR SPEC: 1.02 — SIGNIFICANT CHANGE UP (ref 1.01–1.02)
UROBILINOGEN FLD QL: NEGATIVE — SIGNIFICANT CHANGE UP
WBC # BLD: 4.5 K/UL — SIGNIFICANT CHANGE UP (ref 3.8–10.5)
WBC # FLD AUTO: 4.5 K/UL — SIGNIFICANT CHANGE UP (ref 3.8–10.5)

## 2019-01-03 PROCEDURE — 87040 BLOOD CULTURE FOR BACTERIA: CPT

## 2019-01-03 PROCEDURE — 71046 X-RAY EXAM CHEST 2 VIEWS: CPT | Mod: 26

## 2019-01-03 PROCEDURE — 99284 EMERGENCY DEPT VISIT MOD MDM: CPT

## 2019-01-03 PROCEDURE — 87086 URINE CULTURE/COLONY COUNT: CPT

## 2019-01-03 PROCEDURE — 87798 DETECT AGENT NOS DNA AMP: CPT

## 2019-01-03 PROCEDURE — 80053 COMPREHEN METABOLIC PANEL: CPT

## 2019-01-03 PROCEDURE — 99284 EMERGENCY DEPT VISIT MOD MDM: CPT | Mod: 25

## 2019-01-03 PROCEDURE — 83605 ASSAY OF LACTIC ACID: CPT

## 2019-01-03 PROCEDURE — 84702 CHORIONIC GONADOTROPIN TEST: CPT

## 2019-01-03 PROCEDURE — 96361 HYDRATE IV INFUSION ADD-ON: CPT

## 2019-01-03 PROCEDURE — 81001 URINALYSIS AUTO W/SCOPE: CPT

## 2019-01-03 PROCEDURE — 87486 CHLMYD PNEUM DNA AMP PROBE: CPT

## 2019-01-03 PROCEDURE — 71046 X-RAY EXAM CHEST 2 VIEWS: CPT

## 2019-01-03 PROCEDURE — 85610 PROTHROMBIN TIME: CPT

## 2019-01-03 PROCEDURE — 85730 THROMBOPLASTIN TIME PARTIAL: CPT

## 2019-01-03 PROCEDURE — 85027 COMPLETE CBC AUTOMATED: CPT

## 2019-01-03 PROCEDURE — 87633 RESP VIRUS 12-25 TARGETS: CPT

## 2019-01-03 PROCEDURE — 87581 M.PNEUMON DNA AMP PROBE: CPT

## 2019-01-03 PROCEDURE — 36415 COLL VENOUS BLD VENIPUNCTURE: CPT

## 2019-01-03 RX ORDER — ACETAMINOPHEN 500 MG
650 TABLET ORAL ONCE
Qty: 0 | Refills: 0 | Status: COMPLETED | OUTPATIENT
Start: 2019-01-03 | End: 2019-01-03

## 2019-01-03 RX ORDER — ONDANSETRON 8 MG/1
4 TABLET, FILM COATED ORAL ONCE
Qty: 0 | Refills: 0 | Status: COMPLETED | OUTPATIENT
Start: 2019-01-03 | End: 2019-01-03

## 2019-01-03 RX ORDER — SODIUM CHLORIDE 9 MG/ML
1000 INJECTION INTRAMUSCULAR; INTRAVENOUS; SUBCUTANEOUS ONCE
Qty: 0 | Refills: 0 | Status: COMPLETED | OUTPATIENT
Start: 2019-01-03 | End: 2019-01-03

## 2019-01-03 RX ADMIN — Medication 650 MILLIGRAM(S): at 17:30

## 2019-01-03 RX ADMIN — Medication 650 MILLIGRAM(S): at 17:00

## 2019-01-03 RX ADMIN — ONDANSETRON 4 MILLIGRAM(S): 8 TABLET, FILM COATED ORAL at 17:00

## 2019-01-03 RX ADMIN — SODIUM CHLORIDE 1000 MILLILITER(S): 9 INJECTION INTRAMUSCULAR; INTRAVENOUS; SUBCUTANEOUS at 17:00

## 2019-01-03 RX ADMIN — SODIUM CHLORIDE 1000 MILLILITER(S): 9 INJECTION INTRAMUSCULAR; INTRAVENOUS; SUBCUTANEOUS at 18:00

## 2019-01-03 NOTE — ED PROVIDER NOTE - MEDICAL DECISION MAKING DETAILS
patient presenting with viral like illness. no nuchal rigidity or ams to suggest meningitis. lung clear. well appearign. tachycardia likely 2/2 to fever, will treat fever, fluid hydration, lab work, rvp and reassess

## 2019-01-03 NOTE — ED PROVIDER NOTE - OBJECTIVE STATEMENT
28 y.o presenting with fever, cough, malaise x 1 day. denies recent travel, sick contact, cp, abd pain, diarrhea. endorse baseline sinus tach due adhd meds. endorses mild headache consistent with prior migraine associated with phophobia and pressure like headache.

## 2019-01-03 NOTE — ED PROVIDER NOTE - PROGRESS NOTE DETAILS
Patient endorses feeling better. hr manual palp 96. patient flu +. endorses wanting trial of tamiflu. endorses she feels ready to go home. return precaution given.

## 2019-01-04 LAB
CULTURE RESULTS: SIGNIFICANT CHANGE UP
SPECIMEN SOURCE: SIGNIFICANT CHANGE UP

## 2019-01-09 LAB
CULTURE RESULTS: SIGNIFICANT CHANGE UP
CULTURE RESULTS: SIGNIFICANT CHANGE UP
SPECIMEN SOURCE: SIGNIFICANT CHANGE UP
SPECIMEN SOURCE: SIGNIFICANT CHANGE UP

## 2019-04-02 ENCOUNTER — TRANSCRIPTION ENCOUNTER (OUTPATIENT)
Age: 29
End: 2019-04-02

## 2019-04-22 ENCOUNTER — TRANSCRIPTION ENCOUNTER (OUTPATIENT)
Age: 29
End: 2019-04-22

## 2019-04-27 ENCOUNTER — TRANSCRIPTION ENCOUNTER (OUTPATIENT)
Age: 29
End: 2019-04-27

## 2019-05-09 ENCOUNTER — TRANSCRIPTION ENCOUNTER (OUTPATIENT)
Age: 29
End: 2019-05-09

## 2019-05-17 ENCOUNTER — TRANSCRIPTION ENCOUNTER (OUTPATIENT)
Age: 29
End: 2019-05-17

## 2019-05-29 ENCOUNTER — TRANSCRIPTION ENCOUNTER (OUTPATIENT)
Age: 29
End: 2019-05-29

## 2019-10-24 ENCOUNTER — TRANSCRIPTION ENCOUNTER (OUTPATIENT)
Age: 29
End: 2019-10-24

## 2020-12-13 ENCOUNTER — TRANSCRIPTION ENCOUNTER (OUTPATIENT)
Age: 30
End: 2020-12-13

## 2020-12-31 ENCOUNTER — EMERGENCY (EMERGENCY)
Facility: HOSPITAL | Age: 30
LOS: 1 days | Discharge: HOME | End: 2020-12-31
Attending: STUDENT IN AN ORGANIZED HEALTH CARE EDUCATION/TRAINING PROGRAM
Payer: COMMERCIAL

## 2020-12-31 VITALS
DIASTOLIC BLOOD PRESSURE: 64 MMHG | SYSTOLIC BLOOD PRESSURE: 103 MMHG | RESPIRATION RATE: 17 BRPM | TEMPERATURE: 97 F | HEART RATE: 75 BPM | OXYGEN SATURATION: 100 %

## 2020-12-31 VITALS
OXYGEN SATURATION: 100 % | TEMPERATURE: 98 F | DIASTOLIC BLOOD PRESSURE: 94 MMHG | RESPIRATION RATE: 18 BRPM | SYSTOLIC BLOOD PRESSURE: 152 MMHG | HEIGHT: 63 IN | WEIGHT: 131.84 LBS | HEART RATE: 98 BPM

## 2020-12-31 LAB
ALBUMIN SERPL ELPH-MCNC: 4 G/DL — SIGNIFICANT CHANGE UP (ref 3.5–5)
ALP SERPL-CCNC: 72 U/L — SIGNIFICANT CHANGE UP (ref 40–120)
ALT FLD-CCNC: 24 U/L DA — SIGNIFICANT CHANGE UP (ref 10–60)
ANION GAP SERPL CALC-SCNC: 9 MMOL/L — SIGNIFICANT CHANGE UP (ref 5–17)
APPEARANCE UR: CLEAR — SIGNIFICANT CHANGE UP
AST SERPL-CCNC: 16 U/L — SIGNIFICANT CHANGE UP (ref 10–40)
BACTERIA # UR AUTO: ABNORMAL /HPF
BASOPHILS # BLD AUTO: 0.02 K/UL — SIGNIFICANT CHANGE UP (ref 0–0.2)
BASOPHILS NFR BLD AUTO: 0.3 % — SIGNIFICANT CHANGE UP (ref 0–2)
BILIRUB SERPL-MCNC: 0.3 MG/DL — SIGNIFICANT CHANGE UP (ref 0.2–1.2)
BILIRUB UR-MCNC: NEGATIVE — SIGNIFICANT CHANGE UP
BLD GP AB SCN SERPL QL: SIGNIFICANT CHANGE UP
BUN SERPL-MCNC: 9 MG/DL — SIGNIFICANT CHANGE UP (ref 7–18)
CALCIUM SERPL-MCNC: 9 MG/DL — SIGNIFICANT CHANGE UP (ref 8.4–10.5)
CHLORIDE SERPL-SCNC: 108 MMOL/L — SIGNIFICANT CHANGE UP (ref 96–108)
CO2 SERPL-SCNC: 25 MMOL/L — SIGNIFICANT CHANGE UP (ref 22–31)
COLOR SPEC: YELLOW — SIGNIFICANT CHANGE UP
CREAT SERPL-MCNC: 1.12 MG/DL — SIGNIFICANT CHANGE UP (ref 0.5–1.3)
DIFF PNL FLD: NEGATIVE — SIGNIFICANT CHANGE UP
EOSINOPHIL # BLD AUTO: 0.05 K/UL — SIGNIFICANT CHANGE UP (ref 0–0.5)
EOSINOPHIL NFR BLD AUTO: 0.8 % — SIGNIFICANT CHANGE UP (ref 0–6)
EPI CELLS # UR: ABNORMAL /HPF
GLUCOSE SERPL-MCNC: 93 MG/DL — SIGNIFICANT CHANGE UP (ref 70–99)
GLUCOSE UR QL: NEGATIVE — SIGNIFICANT CHANGE UP
HCG SERPL-ACNC: <1 MIU/ML — SIGNIFICANT CHANGE UP
HCT VFR BLD CALC: 40.4 % — SIGNIFICANT CHANGE UP (ref 34.5–45)
HGB BLD-MCNC: 13.5 G/DL — SIGNIFICANT CHANGE UP (ref 11.5–15.5)
HYALINE CASTS # UR AUTO: ABNORMAL /LPF
IMM GRANULOCYTES NFR BLD AUTO: 0.3 % — SIGNIFICANT CHANGE UP (ref 0–1.5)
KETONES UR-MCNC: NEGATIVE — SIGNIFICANT CHANGE UP
LEUKOCYTE ESTERASE UR-ACNC: NEGATIVE — SIGNIFICANT CHANGE UP
LYMPHOCYTES # BLD AUTO: 1.89 K/UL — SIGNIFICANT CHANGE UP (ref 1–3.3)
LYMPHOCYTES # BLD AUTO: 31.2 % — SIGNIFICANT CHANGE UP (ref 13–44)
MAGNESIUM SERPL-MCNC: 2 MG/DL — SIGNIFICANT CHANGE UP (ref 1.6–2.6)
MCHC RBC-ENTMCNC: 29.6 PG — SIGNIFICANT CHANGE UP (ref 27–34)
MCHC RBC-ENTMCNC: 33.4 GM/DL — SIGNIFICANT CHANGE UP (ref 32–36)
MCV RBC AUTO: 88.6 FL — SIGNIFICANT CHANGE UP (ref 80–100)
MONOCYTES # BLD AUTO: 0.35 K/UL — SIGNIFICANT CHANGE UP (ref 0–0.9)
MONOCYTES NFR BLD AUTO: 5.8 % — SIGNIFICANT CHANGE UP (ref 2–14)
NEUTROPHILS # BLD AUTO: 3.72 K/UL — SIGNIFICANT CHANGE UP (ref 1.8–7.4)
NEUTROPHILS NFR BLD AUTO: 61.6 % — SIGNIFICANT CHANGE UP (ref 43–77)
NITRITE UR-MCNC: NEGATIVE — SIGNIFICANT CHANGE UP
NRBC # BLD: 0 /100 WBCS — SIGNIFICANT CHANGE UP (ref 0–0)
PH UR: 6 — SIGNIFICANT CHANGE UP (ref 5–8)
PLATELET # BLD AUTO: 201 K/UL — SIGNIFICANT CHANGE UP (ref 150–400)
POTASSIUM SERPL-MCNC: 3.9 MMOL/L — SIGNIFICANT CHANGE UP (ref 3.5–5.3)
POTASSIUM SERPL-SCNC: 3.9 MMOL/L — SIGNIFICANT CHANGE UP (ref 3.5–5.3)
PROT SERPL-MCNC: 7.5 G/DL — SIGNIFICANT CHANGE UP (ref 6–8.3)
PROT UR-MCNC: NEGATIVE — SIGNIFICANT CHANGE UP
RAPID RVP RESULT: SIGNIFICANT CHANGE UP
RBC # BLD: 4.56 M/UL — SIGNIFICANT CHANGE UP (ref 3.8–5.2)
RBC # FLD: 12 % — SIGNIFICANT CHANGE UP (ref 10.3–14.5)
RBC CASTS # UR COMP ASSIST: SIGNIFICANT CHANGE UP /HPF (ref 0–2)
SARS-COV-2 RNA SPEC QL NAA+PROBE: SIGNIFICANT CHANGE UP
SODIUM SERPL-SCNC: 142 MMOL/L — SIGNIFICANT CHANGE UP (ref 135–145)
SP GR SPEC: 1.01 — SIGNIFICANT CHANGE UP (ref 1.01–1.02)
UROBILINOGEN FLD QL: NEGATIVE — SIGNIFICANT CHANGE UP
WBC # BLD: 6.05 K/UL — SIGNIFICANT CHANGE UP (ref 3.8–10.5)
WBC # FLD AUTO: 6.05 K/UL — SIGNIFICANT CHANGE UP (ref 3.8–10.5)
WBC UR QL: SIGNIFICANT CHANGE UP /HPF (ref 0–5)

## 2020-12-31 PROCEDURE — 86901 BLOOD TYPING SEROLOGIC RH(D): CPT

## 2020-12-31 PROCEDURE — 81001 URINALYSIS AUTO W/SCOPE: CPT

## 2020-12-31 PROCEDURE — 83690 ASSAY OF LIPASE: CPT

## 2020-12-31 PROCEDURE — 83735 ASSAY OF MAGNESIUM: CPT

## 2020-12-31 PROCEDURE — 99284 EMERGENCY DEPT VISIT MOD MDM: CPT

## 2020-12-31 PROCEDURE — 87086 URINE CULTURE/COLONY COUNT: CPT

## 2020-12-31 PROCEDURE — 85025 COMPLETE CBC W/AUTO DIFF WBC: CPT

## 2020-12-31 PROCEDURE — 86900 BLOOD TYPING SEROLOGIC ABO: CPT

## 2020-12-31 PROCEDURE — 99284 EMERGENCY DEPT VISIT MOD MDM: CPT | Mod: 25

## 2020-12-31 PROCEDURE — 86850 RBC ANTIBODY SCREEN: CPT

## 2020-12-31 PROCEDURE — 0225U NFCT DS DNA&RNA 21 SARSCOV2: CPT

## 2020-12-31 PROCEDURE — 36415 COLL VENOUS BLD VENIPUNCTURE: CPT

## 2020-12-31 PROCEDURE — 84702 CHORIONIC GONADOTROPIN TEST: CPT

## 2020-12-31 PROCEDURE — 96375 TX/PRO/DX INJ NEW DRUG ADDON: CPT

## 2020-12-31 PROCEDURE — 96374 THER/PROPH/DIAG INJ IV PUSH: CPT | Mod: XU

## 2020-12-31 PROCEDURE — 74177 CT ABD & PELVIS W/CONTRAST: CPT

## 2020-12-31 PROCEDURE — 80053 COMPREHEN METABOLIC PANEL: CPT

## 2020-12-31 PROCEDURE — 74177 CT ABD & PELVIS W/CONTRAST: CPT | Mod: 26

## 2020-12-31 RX ORDER — CEPHALEXIN 500 MG
1 CAPSULE ORAL
Qty: 14 | Refills: 0
Start: 2020-12-31 | End: 2021-01-06

## 2020-12-31 RX ORDER — SODIUM CHLORIDE 9 MG/ML
1000 INJECTION INTRAMUSCULAR; INTRAVENOUS; SUBCUTANEOUS ONCE
Refills: 0 | Status: COMPLETED | OUTPATIENT
Start: 2020-12-31 | End: 2020-12-31

## 2020-12-31 RX ORDER — KETOROLAC TROMETHAMINE 30 MG/ML
15 SYRINGE (ML) INJECTION ONCE
Refills: 0 | Status: DISCONTINUED | OUTPATIENT
Start: 2020-12-31 | End: 2020-12-31

## 2020-12-31 RX ORDER — CEFTRIAXONE 500 MG/1
1000 INJECTION, POWDER, FOR SOLUTION INTRAMUSCULAR; INTRAVENOUS ONCE
Refills: 0 | Status: COMPLETED | OUTPATIENT
Start: 2020-12-31 | End: 2020-12-31

## 2020-12-31 RX ADMIN — Medication 15 MILLIGRAM(S): at 22:49

## 2020-12-31 RX ADMIN — SODIUM CHLORIDE 1000 MILLILITER(S): 9 INJECTION INTRAMUSCULAR; INTRAVENOUS; SUBCUTANEOUS at 22:48

## 2020-12-31 RX ADMIN — CEFTRIAXONE 100 MILLIGRAM(S): 500 INJECTION, POWDER, FOR SOLUTION INTRAMUSCULAR; INTRAVENOUS at 23:42

## 2020-12-31 NOTE — ED PROVIDER NOTE - NSFOLLOWUPCLINICS_GEN_ALL_ED_FT
[facility]  Urology  [address] [place] [address]  Phone: [phone]  Fax: [phone]  Follow Up Time: 4-6 Days

## 2020-12-31 NOTE — ED ADULT TRIAGE NOTE - ARRIVAL FROM
Patient arrived ambulatory to [facility], allergies and medications reviewed with patient, Dr. Cardenas in to discuss with patient possible options. New dressing applied to drain site with (1) IV gauze, (1) 4 x 4 an (1) 6 x 8 Tegaderm. Patient tolerated well, patient and wife deny any further questions at this time.   Patient discharged ambulatory  
Home

## 2020-12-31 NOTE — ED PROVIDER NOTE - PATIENT PORTAL LINK FT
You can access the FollowMyHealth Patient Portal offered by [facility] by registering at the following website: [email or web address]. By joining [employer]’s FollowMyHealth portal, you will also be able to view your health information using other applications (apps) compatible with our system.

## 2020-12-31 NOTE — ED PROVIDER NOTE - NSFOLLOWUPINSTRUCTIONS_ED_ALL_ED_FT
You were seen in the emergency room today for belly pain. We are treating you for a urinary tract infection. Please see your primary doctor within 3 days to ensure that you are improving. Return to the ER if you have any worsening symptoms.    Please  your prescription and take as directed. Immediately seek medical attention or return to the ER if you have signs or symptoms of an allergic reaction after taking the medication (including- rash or swelling, wheezing or shortness or breath, vomiting or belly pain).    We no longer feel that you need further emergency care or admission to the hospital at this time.    While we have determined that you are currently stable for discharge, we know that things can change. Please seek immediate medical attention or return to the ER if you experience any of the following:  Any worsening or persistent symptoms  Severe Pain  Chest Pain  Difficulty Breathing  Bleeding  Passing Out  Severe Rash  Inability to Eat or Drink  Persistent Fever    Please see a primary care doctor or specialist within 5 days to ensure that you are improving.    Please call the [employer] phone numbers on this document if you have any problems obtaining a follow up appointment.    I wish you well! -Dr Quach

## 2020-12-31 NOTE — ED PROVIDER NOTE - OBJECTIVE STATEMENT
30 female to male transition, no pmh, presenting with abdominal. patient reports right lower abdominal pain that began this has been constant since this morning that was initially dull but is occasionally sharp. had some nausea that resolved. reports constipation earlier. no history of abdominal surgeries. no fever chest pain, shortness of breath, vomiting, pain or burning with urination, pain or swelling of lower extremities.

## 2020-12-31 NOTE — ED ADULT TRIAGE NOTE - CHIEF COMPLAINT QUOTE
Pt compliant right lower abd pain with nausea started 2pm today. Pt states she tested negative for corona virus 2 weeks ago.

## 2020-12-31 NOTE — ED PROVIDER NOTE - PHYSICAL EXAMINATION
General: well appearing, no acute distress   HEENT: normocephalic, atraumatic   Respiratory: normal work of breathing, lungs clear to auscultation bilaterally   Cardiac: regular rate and rhythm   Abdomen: RLQ tenderness to palpation, on guarding or rebound, no CVA tenderness   MSK: no swelling or tenderness of lower extremities, moving all extremities spontaneously   Skin: warm, dry   Neuro: A&Ox3  Psych: appropriate affect

## 2020-12-31 NOTE — ED PROVIDER NOTE - CLINICAL SUMMARY MEDICAL DECISION MAKING FREE TEXT BOX
30 female to male transition presenting with RLQ abdominal pain. no history of abdominal surgeries. tenderness on exam. concern for appendicitis, colitis, pancreatitis. will get labs, CT abdomen. will reassess. 30 female to male transition presenting with RLQ abdominal pain. no history of abdominal surgeries. tenderness on exam. concern for appendicitis, colitis, pancreatitis. will get labs, CT abdomen. will reassess.    Cass 2351:  Pt s/o pending CT scan. Pt states that her pain and overall symptoms are better s/p fluids and toradol. Labs and UA unremarkable and CT showing no acute path. Pt c/o dysuria with the recent ABD pain and states that her symptoms are consistent with previous UTIs (last UTI 2 months ago and resolved with PO ABx). Given hx and dysuria I am treating pt for cystitis. First dose ABx given in the ED and Rx keflex. Also giving  f/u as pt has had several UTIs in past 2 years. Most likely uncomplicated cystitis vs other nonemergent etiology of symptoms, details of case, history, and exam making a more emergent diagnosis much less likely. Discussed with pt my clinical impression and results, patient given strict return precautions if persistent or worsening of symptoms occurs, and need for close follow up. Pt well appearing with a reassuring exam. Pt expressed understanding and agrees with plan. Discharge home with PMD or Specialist f/u within 5 days.

## 2021-01-01 LAB
CULTURE RESULTS: SIGNIFICANT CHANGE UP
SPECIMEN SOURCE: SIGNIFICANT CHANGE UP

## 2022-02-21 ENCOUNTER — TRANSCRIPTION ENCOUNTER (OUTPATIENT)
Age: 32
End: 2022-02-21

## 2022-03-06 ENCOUNTER — TRANSCRIPTION ENCOUNTER (OUTPATIENT)
Age: 32
End: 2022-03-06

## 2022-03-11 ENCOUNTER — APPOINTMENT (OUTPATIENT)
Dept: ORTHOPEDIC SURGERY | Facility: CLINIC | Age: 32
End: 2022-03-11
Payer: COMMERCIAL

## 2022-03-11 VITALS — WEIGHT: 155 LBS | RESPIRATION RATE: 16 BRPM | HEIGHT: 63 IN | BODY MASS INDEX: 27.46 KG/M2

## 2022-03-11 DIAGNOSIS — Z82.61 FAMILY HISTORY OF ARTHRITIS: ICD-10-CM

## 2022-03-11 DIAGNOSIS — R20.0 ANESTHESIA OF SKIN: ICD-10-CM

## 2022-03-11 DIAGNOSIS — R20.2 ANESTHESIA OF SKIN: ICD-10-CM

## 2022-03-11 PROBLEM — Z00.00 ENCOUNTER FOR PREVENTIVE HEALTH EXAMINATION: Status: ACTIVE | Noted: 2022-03-11

## 2022-03-11 PROCEDURE — 73070 X-RAY EXAM OF ELBOW: CPT | Mod: RT

## 2022-03-11 PROCEDURE — 20526 THER INJECTION CARP TUNNEL: CPT | Mod: RT

## 2022-03-11 PROCEDURE — 73110 X-RAY EXAM OF WRIST: CPT | Mod: 50

## 2022-03-11 PROCEDURE — 99203 OFFICE O/P NEW LOW 30 MIN: CPT | Mod: 25

## 2022-03-11 RX ORDER — BUPROPION HYDROCHLORIDE 300 MG/1
300 TABLET, EXTENDED RELEASE ORAL
Refills: 0 | Status: ACTIVE | COMMUNITY

## 2022-03-11 RX ORDER — METHYLPHENIDATE HYDROCHLORIDE 54 MG/1
54 TABLET, EXTENDED RELEASE ORAL
Refills: 0 | Status: ACTIVE | COMMUNITY

## 2022-03-11 RX ORDER — METOPROLOL TARTRATE 50 MG/1
50 TABLET, FILM COATED ORAL
Refills: 0 | Status: ACTIVE | COMMUNITY

## 2022-03-11 RX ORDER — TESTOSTERONE 30 MG/1.5ML
SOLUTION TOPICAL
Refills: 0 | Status: ACTIVE | COMMUNITY

## 2022-10-29 ENCOUNTER — NON-APPOINTMENT (OUTPATIENT)
Age: 32
End: 2022-10-29

## 2023-01-02 ENCOUNTER — NON-APPOINTMENT (OUTPATIENT)
Age: 33
End: 2023-01-02

## 2023-01-12 NOTE — DISCHARGE NOTE ADULT - REASON FOR ADMISSION
Mom had issues picking up medication at preferred pharmacy  Stated they did not have it  Attempted to call pharmacy for four hours and was unable to get through  Called a second location who looked into it and recommended mom walk in and wait for it to be filled   Called mom back and let her know what was recommended R eye vision changes

## 2023-03-03 ENCOUNTER — NON-APPOINTMENT (OUTPATIENT)
Age: 33
End: 2023-03-03

## 2023-03-30 ENCOUNTER — NON-APPOINTMENT (OUTPATIENT)
Age: 33
End: 2023-03-30

## 2023-08-12 ENCOUNTER — NON-APPOINTMENT (OUTPATIENT)
Age: 33
End: 2023-08-12

## 2023-08-15 ENCOUNTER — NON-APPOINTMENT (OUTPATIENT)
Age: 33
End: 2023-08-15

## 2023-08-15 ENCOUNTER — APPOINTMENT (OUTPATIENT)
Dept: OPHTHALMOLOGY | Facility: CLINIC | Age: 33
End: 2023-08-15
Payer: COMMERCIAL

## 2023-08-15 PROCEDURE — 92004 COMPRE OPH EXAM NEW PT 1/>: CPT

## 2023-09-21 ENCOUNTER — NON-APPOINTMENT (OUTPATIENT)
Age: 33
End: 2023-09-21

## 2023-12-13 NOTE — ED ADULT NURSE NOTE - CINV DISCH TEACH PARTICIP
From: En Crawford  To: Liliana Jade  
Patient

## 2024-05-16 ENCOUNTER — APPOINTMENT (OUTPATIENT)
Dept: OTOLARYNGOLOGY | Facility: CLINIC | Age: 34
End: 2024-05-16
Payer: COMMERCIAL

## 2024-05-16 ENCOUNTER — NON-APPOINTMENT (OUTPATIENT)
Age: 34
End: 2024-05-16

## 2024-05-16 VITALS
BODY MASS INDEX: 25.69 KG/M2 | TEMPERATURE: 98.3 F | HEIGHT: 63 IN | WEIGHT: 145 LBS | SYSTOLIC BLOOD PRESSURE: 135 MMHG | HEART RATE: 94 BPM | DIASTOLIC BLOOD PRESSURE: 85 MMHG | OXYGEN SATURATION: 95 %

## 2024-05-16 DIAGNOSIS — Z87.09 PERSONAL HISTORY OF OTHER DISEASES OF THE RESPIRATORY SYSTEM: ICD-10-CM

## 2024-05-16 DIAGNOSIS — R49.0 DYSPHONIA: ICD-10-CM

## 2024-05-16 DIAGNOSIS — Z83.49 FAMILY HISTORY OF OTHER ENDOCRINE, NUTRITIONAL AND METABOLIC DISEASES: ICD-10-CM

## 2024-05-16 DIAGNOSIS — Z78.9 OTHER SPECIFIED HEALTH STATUS: ICD-10-CM

## 2024-05-16 PROCEDURE — 31579 LARYNGOSCOPY TELESCOPIC: CPT

## 2024-05-16 PROCEDURE — 99204 OFFICE O/P NEW MOD 45 MIN: CPT | Mod: 25

## 2024-05-16 RX ORDER — METHOTREXATE 2.5 MG/1
TABLET ORAL
Refills: 0 | Status: ACTIVE | COMMUNITY

## 2024-05-17 PROBLEM — R49.0 DYSPHONIA: Status: ACTIVE | Noted: 2024-05-17

## 2024-05-17 NOTE — PROCEDURE
[de-identified] : - Procedure Note  Pre-operative Diagnosis: Dysphonia Post-operative Diagnosis: Normal Exam Anesthesia: Topical - 1 % Lidocaine/Phenylephrine Procedure:  Flexible Laryngoscopy with Stroboscopy - CPT 20482   Procedure Details:   The patient was placed in the sitting position.  After decongestant and anesthesia were applied the laryngoscope was passed.  The nasal cavities, nasopharynx, oropharynx, hypopharynx, and larynx were all examined.  Vocal folds were examined during respiration and phonation.  The following findings were noted:  Findings:   Nose: Septum is midline, turbinates are normal, nasal airways patent, mucosa normal Nasopharynx: Adenoids normal, no masses, eustachian tube normal Oropharynx: Pharyngeal walls symmetric and without lesion. Tonsils/fossae symmetric Hypopharynx: Hypopharynx and pyriform sinuses without lesion. No masses or asymmetry.  No pooling of secretions. Larynx:  Epiglottis and aryepiglottic folds were sharp and crisp bilaterally.  Bilateral false vocal folds normal appearance. Airway was widely patent.  Strobe Exam Ratings 		 TVF Appearance: normal TVF Mobility: normal Edema/hypertrophy: normal Mucus on TVF: normal Glottic Closure: normal/adequate Mucosal Wave: normal Amplitude of Vibration: normal Phase: symmetric Supraglottic Hyperfunction: none Other Findings: none  Condition: Stable.  Patient tolerated procedure well.  Complications: None  --------------------------------------------------------------------

## 2024-05-17 NOTE — HISTORY OF PRESENT ILLNESS
[de-identified] : - 5/[birth year] 32 yo FTM, who presents with dysphonia.  Pt is trans and is on hormone therapy (testosterone x 3 years).  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  Feels that voice is more fatigue at the end of the day.  Can also fatigue by end of the week.  Voice can sometimes recover after a weekend depending on usage.  Has been working a voice therapy.  They recommended evaluation.  No issues chewing, eating or swallowing.  No issues breathing through mouth.  No other voice demands.  Pt also with concern for sinusitis.  Symptoms will include colored discharge.  Pain in the periorbital region.  +changes in smell or taste.  +dental pain and sensitivity.  This will occur 2 times per year, but this can linger.  Has been on abx x 1 and treated with Augmentin.  Also will use oral histamines.  Will use navage nightly.  Will also use Flonase.

## 2024-06-17 NOTE — ED ADULT NURSE NOTE - INTEGUMENTARY WDL
What Type Of Note Output Would You Prefer (Optional)?: Standard Output Is This A New Presentation, Or A Follow-Up?: Rash Additional History: PCP took bloodwork and tested for Lyme and it was negative. Patient was prescribed triamcinolone and she did not use it because she states that the pcp wasn’t sure what is was so she did not want to use the medicine she prescibed. Color consistent with ethnicity/race, warm, dry intact, resilient.